# Patient Record
Sex: FEMALE | Race: OTHER | Employment: OTHER | URBAN - METROPOLITAN AREA
[De-identification: names, ages, dates, MRNs, and addresses within clinical notes are randomized per-mention and may not be internally consistent; named-entity substitution may affect disease eponyms.]

---

## 2017-12-31 PROBLEM — H10.32 ACUTE BACTERIAL CONJUNCTIVITIS OF LEFT EYE: Status: ACTIVE | Noted: 2017-12-31

## 2018-07-27 ENCOUNTER — OFFICE VISIT (OUTPATIENT)
Dept: PHYSICAL MEDICINE AND REHAB | Age: 68
End: 2018-07-27
Payer: MEDICARE

## 2018-07-27 VITALS
OXYGEN SATURATION: 98 % | BODY MASS INDEX: 22.15 KG/M2 | TEMPERATURE: 97.7 F | HEART RATE: 73 BPM | WEIGHT: 125 LBS | SYSTOLIC BLOOD PRESSURE: 110 MMHG | HEIGHT: 63 IN | DIASTOLIC BLOOD PRESSURE: 68 MMHG

## 2018-07-27 DIAGNOSIS — R20.2 NUMBNESS AND TINGLING OF BOTH LOWER EXTREMITIES: ICD-10-CM

## 2018-07-27 DIAGNOSIS — R20.0 NUMBNESS AND TINGLING OF BOTH LOWER EXTREMITIES: ICD-10-CM

## 2018-07-27 DIAGNOSIS — G62.9 POLYNEUROPATHY: Primary | ICD-10-CM

## 2018-07-27 PROCEDURE — 1036F TOBACCO NON-USER: CPT | Performed by: PHYSICAL MEDICINE & REHABILITATION

## 2018-07-27 PROCEDURE — 1101F PT FALLS ASSESS-DOCD LE1/YR: CPT | Performed by: PHYSICAL MEDICINE & REHABILITATION

## 2018-07-27 PROCEDURE — 95912 NRV CNDJ TEST 11-12 STUDIES: CPT | Performed by: PHYSICAL MEDICINE & REHABILITATION

## 2018-07-27 PROCEDURE — G8420 CALC BMI NORM PARAMETERS: HCPCS | Performed by: PHYSICAL MEDICINE & REHABILITATION

## 2018-07-27 PROCEDURE — G8400 PT W/DXA NO RESULTS DOC: HCPCS | Performed by: PHYSICAL MEDICINE & REHABILITATION

## 2018-07-27 PROCEDURE — 3017F COLORECTAL CA SCREEN DOC REV: CPT | Performed by: PHYSICAL MEDICINE & REHABILITATION

## 2018-07-27 PROCEDURE — 99202 OFFICE O/P NEW SF 15 MIN: CPT | Performed by: PHYSICAL MEDICINE & REHABILITATION

## 2018-07-27 PROCEDURE — 95886 MUSC TEST DONE W/N TEST COMP: CPT | Performed by: PHYSICAL MEDICINE & REHABILITATION

## 2018-07-27 PROCEDURE — 4040F PNEUMOC VAC/ADMIN/RCVD: CPT | Performed by: PHYSICAL MEDICINE & REHABILITATION

## 2018-07-27 PROCEDURE — 1090F PRES/ABSN URINE INCON ASSESS: CPT | Performed by: PHYSICAL MEDICINE & REHABILITATION

## 2018-07-27 PROCEDURE — G8427 DOCREV CUR MEDS BY ELIG CLIN: HCPCS | Performed by: PHYSICAL MEDICINE & REHABILITATION

## 2018-07-27 PROCEDURE — 1123F ACP DISCUSS/DSCN MKR DOCD: CPT | Performed by: PHYSICAL MEDICINE & REHABILITATION

## 2018-07-27 RX ORDER — LEVOCETIRIZINE DIHYDROCHLORIDE 5 MG/1
5 TABLET, FILM COATED ORAL PRN
COMMUNITY

## 2018-07-27 NOTE — PROGRESS NOTES
[address] [facility]        Patient: Ascencion Davis Unit #: [id number]  Test Date: 07/27/18 Technician: Love Morales  Sex: Female Interpr. Phy.: Dr. Pedro Luis Mesa  YOB: 1950 Referring Phy.: Dr. Ruthann Restrepo  Age: 78 Years 10 Months        Ascencion Davis is a 78 y.o. female who was seen today for an EMG of the bilateral lower extremities to evaluate for lumbar radiculopathy at your request. See separate note from same day for more details of the history and examination. Consent: The patient was advised of the indications, risks, benefits and alternatives to nerve conduction studies and electromyography and agreed to proceed. All abnormal values are clearly identified with bold text in the data collected from today's study. Normal values by age are provided at the end of this report for your review. Temperature was monitored throughout the nerve conductions via surface probe and maintained above 32* C in the upper extremities and 30*c in the lower extremities unless otherwise noted. Sensory NCS Lower      Nerve / Sites Onset Peak PP Amp Dist Bruno Temp.    ms ms µV cm m/s °C   R SURAL - Lat Mall      Ach. Ten. NR NR NR 14 NR 31.7   L SURAL - Lat Mall      Ach. Ten. NR NR NR 14 NR 31.1   R SUP PERONEAL      Lat Leg NR NR NR 10 NR 31.7   L SUP PERONEAL      Lat Leg NR NR NR 10 NR 31.1       Motor NCS Lower      Nerve / Sites Onset Amp. 1-2 Dist Bruno Temp. Amp. 1-2 d Lat.     ms mV cm m/s °C % ms   R COMM PERONEAL - EDB      Ankle 4.48 2.6 8  32 100 4.48      Knee 11.20 2.4 32 47.6 32 94.3 6.72   L COMM PERONEAL - EDB      Ankle 4.48 3.6 8  31.8 100 4.48      FibHead 11.15 3.6 32 48.0 31.8 99.4 6.67   R TIBIAL (KNEE) - AH      Ankle 4.01 10.8 8  31.7 100 4.01      Knee 10.99 8.7 34 48.7 31.7 80.9 6.98   L TIBIAL (KNEE) - AH      Ankle 3.23 7.5 8  31.7 100 3.23      Knee 11.46 6.0 34 41.3 31.7 80.0 8.23       F  Wave      Nerve Fmin    ms   R COMM PERONEAL 48.65   R TIBIAL (KNEE) 47.55   L

## 2018-08-10 ENCOUNTER — TELEPHONE (OUTPATIENT)
Dept: ORTHOPEDIC SURGERY | Age: 68
End: 2018-08-10

## 2018-08-10 DIAGNOSIS — R20.0 BILATERAL HAND NUMBNESS: Primary | ICD-10-CM

## 2018-08-13 ENCOUNTER — OFFICE VISIT (OUTPATIENT)
Dept: ORTHOPEDIC SURGERY | Age: 68
End: 2018-08-13
Payer: MEDICARE

## 2018-08-13 VITALS
HEART RATE: 69 BPM | HEIGHT: 60 IN | DIASTOLIC BLOOD PRESSURE: 69 MMHG | TEMPERATURE: 97.2 F | BODY MASS INDEX: 24.54 KG/M2 | RESPIRATION RATE: 16 BRPM | SYSTOLIC BLOOD PRESSURE: 102 MMHG | WEIGHT: 125 LBS

## 2018-08-13 DIAGNOSIS — M19.049 HAND ARTHRITIS: Primary | ICD-10-CM

## 2018-08-13 PROCEDURE — 4040F PNEUMOC VAC/ADMIN/RCVD: CPT | Performed by: ORTHOPAEDIC SURGERY

## 2018-08-13 PROCEDURE — 99214 OFFICE O/P EST MOD 30 MIN: CPT | Performed by: ORTHOPAEDIC SURGERY

## 2018-08-13 PROCEDURE — G8420 CALC BMI NORM PARAMETERS: HCPCS | Performed by: ORTHOPAEDIC SURGERY

## 2018-08-13 PROCEDURE — 1090F PRES/ABSN URINE INCON ASSESS: CPT | Performed by: ORTHOPAEDIC SURGERY

## 2018-08-13 PROCEDURE — 1036F TOBACCO NON-USER: CPT | Performed by: ORTHOPAEDIC SURGERY

## 2018-08-13 PROCEDURE — G8400 PT W/DXA NO RESULTS DOC: HCPCS | Performed by: ORTHOPAEDIC SURGERY

## 2018-08-13 PROCEDURE — 3017F COLORECTAL CA SCREEN DOC REV: CPT | Performed by: ORTHOPAEDIC SURGERY

## 2018-08-13 PROCEDURE — 1101F PT FALLS ASSESS-DOCD LE1/YR: CPT | Performed by: ORTHOPAEDIC SURGERY

## 2018-08-13 PROCEDURE — 1123F ACP DISCUSS/DSCN MKR DOCD: CPT | Performed by: ORTHOPAEDIC SURGERY

## 2018-08-13 PROCEDURE — G8427 DOCREV CUR MEDS BY ELIG CLIN: HCPCS | Performed by: ORTHOPAEDIC SURGERY

## 2018-08-13 NOTE — PROGRESS NOTES
Department of Orthopedic Surgery  History & Physical Exam      CHIEF COMPLAINT:   Chief Complaint   Patient presents with    Hand Pain     B/L Hand pain right is worse        HISTORY OF PRESENT ILLNESS:                The patient is a 78 y.o. female who presents with significant right index finger PIP pain. She has been recently seen within the past year for symptoms of carpal tunnel, dequervain which was injected, and general hand osteoarthritis. Her pain to the index finger pip is now constant, relieved by nothing, and pains her at night. She does not want an injection of the joint at states at this point she is ready for surgical intervention    Past Medical History:        Diagnosis Date    Arthritis     hands    Back problem     Constipation     Dizzy     states retains fluid in my head, takes maxzide to treat    Hyperlipidemia     Hypothyroidism     Nausea & vomiting     Neck problem     Stomach pain     Thyroid disease      Past Surgical History:        Procedure Laterality Date    COLONOSCOPY  6/6/2012    TUBAL LIGATION      UTERINE FIBROID SURGERY       Current Medications:   No current facility-administered medications for this visit. Allergies:  Iodine; Prednisone; and Demerol    Social History:   TOBACCO:   reports that she is a non-smoker but has been exposed to tobacco smoke. She has never used smokeless tobacco.  ETOH:   reports that she does not drink alcohol. DRUGS:   reports that she does not use drugs.   ACTIVITIES OF DAILY LIVING:    OCCUPATION:    Family History:   Family History   Problem Relation Age of Onset    Heart Disease Mother     Other Father         ACCIDENT       REVIEW OF SYSTEMS:   Skin: no abnormal pigmentation, rash  Eyes: no blurring or eye pain   Ears/Nose/Throat: no hearing loss, tinnitus  Respiratory: No increased work of breathing, no coughing  Cardiovascular: Brisk capillary refill bilaterally, well perfused extremities  Gastrointestinal: no nausea,

## 2018-08-20 ENCOUNTER — PREP FOR PROCEDURE (OUTPATIENT)
Dept: ORTHOPEDIC SURGERY | Age: 68
End: 2018-08-20

## 2018-08-20 RX ORDER — SODIUM CHLORIDE 0.9 % (FLUSH) 0.9 %
10 SYRINGE (ML) INJECTION PRN
Status: CANCELLED | OUTPATIENT
Start: 2018-08-20 | End: 2019-08-20

## 2018-08-20 RX ORDER — SODIUM CHLORIDE 9 MG/ML
INJECTION, SOLUTION INTRAVENOUS CONTINUOUS
Status: CANCELLED | OUTPATIENT
Start: 2018-08-20 | End: 2019-08-20

## 2018-08-20 RX ORDER — SODIUM CHLORIDE 0.9 % (FLUSH) 0.9 %
10 SYRINGE (ML) INJECTION EVERY 12 HOURS SCHEDULED
Status: CANCELLED | OUTPATIENT
Start: 2018-08-20 | End: 2019-08-20

## 2018-10-05 ENCOUNTER — HOSPITAL ENCOUNTER (OUTPATIENT)
Dept: PREADMISSION TESTING | Age: 68
Discharge: HOME | End: 2018-10-05
Payer: MEDICARE

## 2018-10-05 ENCOUNTER — ANESTHESIA EVENT (OUTPATIENT)
Dept: OPERATING ROOM | Age: 68
End: 2018-10-05
Payer: MEDICARE

## 2018-10-05 VITALS
HEART RATE: 68 BPM | WEIGHT: 125.5 LBS | SYSTOLIC BLOOD PRESSURE: 134 MMHG | TEMPERATURE: 97.7 F | RESPIRATION RATE: 16 BRPM | DIASTOLIC BLOOD PRESSURE: 70 MMHG | BODY MASS INDEX: 24.64 KG/M2 | HEIGHT: 60 IN | OXYGEN SATURATION: 98 %

## 2018-10-05 LAB
ANION GAP SERPL CALCULATED.3IONS-SCNC: 9 MMOL/L (ref 7–16)
BUN BLDV-MCNC: 16 MG/DL (ref 8–23)
CALCIUM SERPL-MCNC: 9.8 MG/DL (ref 8.6–10.2)
CHLORIDE BLD-SCNC: 103 MMOL/L (ref 98–107)
CO2: 28 MMOL/L (ref 22–29)
CREAT SERPL-MCNC: 0.7 MG/DL (ref 0.5–1)
GFR AFRICAN AMERICAN: >60
GFR NON-AFRICAN AMERICAN: >60 ML/MIN/1.73
GLUCOSE BLD-MCNC: 92 MG/DL (ref 74–109)
HCT VFR BLD CALC: 40.4 % (ref 34–48)
HEMOGLOBIN: 13.4 G/DL (ref 11.5–15.5)
MCH RBC QN AUTO: 27.8 PG (ref 26–35)
MCHC RBC AUTO-ENTMCNC: 33.2 % (ref 32–34.5)
MCV RBC AUTO: 83.8 FL (ref 80–99.9)
PDW BLD-RTO: 14.1 FL (ref 11.5–15)
PLATELET # BLD: 202 E9/L (ref 130–450)
PMV BLD AUTO: 11 FL (ref 7–12)
POTASSIUM SERPL-SCNC: 4.4 MMOL/L (ref 3.5–5)
RBC # BLD: 4.82 E12/L (ref 3.5–5.5)
SODIUM BLD-SCNC: 140 MMOL/L (ref 132–146)
WBC # BLD: 4.8 E9/L (ref 4.5–11.5)

## 2018-10-05 PROCEDURE — 36415 COLL VENOUS BLD VENIPUNCTURE: CPT

## 2018-10-05 PROCEDURE — 87081 CULTURE SCREEN ONLY: CPT

## 2018-10-05 PROCEDURE — 85027 COMPLETE CBC AUTOMATED: CPT

## 2018-10-05 PROCEDURE — 80048 BASIC METABOLIC PNL TOTAL CA: CPT

## 2018-10-05 RX ORDER — GABAPENTIN 100 MG/1
100 CAPSULE ORAL 3 TIMES DAILY
COMMUNITY
End: 2019-02-20

## 2018-10-06 LAB — MRSA CULTURE ONLY: NORMAL

## 2018-10-10 ENCOUNTER — ANESTHESIA (OUTPATIENT)
Dept: OPERATING ROOM | Age: 68
End: 2018-10-10
Payer: MEDICARE

## 2018-10-10 ENCOUNTER — APPOINTMENT (OUTPATIENT)
Dept: GENERAL RADIOLOGY | Age: 68
End: 2018-10-10
Attending: ORTHOPAEDIC SURGERY
Payer: MEDICARE

## 2018-10-10 ENCOUNTER — HOSPITAL ENCOUNTER (OUTPATIENT)
Age: 68
Setting detail: OUTPATIENT SURGERY
Discharge: HOME | End: 2018-10-10
Attending: ORTHOPAEDIC SURGERY | Admitting: ORTHOPAEDIC SURGERY
Payer: MEDICARE

## 2018-10-10 VITALS
DIASTOLIC BLOOD PRESSURE: 61 MMHG | TEMPERATURE: 92.5 F | OXYGEN SATURATION: 100 % | RESPIRATION RATE: 7 BRPM | SYSTOLIC BLOOD PRESSURE: 127 MMHG

## 2018-10-10 VITALS
BODY MASS INDEX: 24.54 KG/M2 | TEMPERATURE: 97.3 F | SYSTOLIC BLOOD PRESSURE: 136 MMHG | DIASTOLIC BLOOD PRESSURE: 63 MMHG | WEIGHT: 125 LBS | OXYGEN SATURATION: 97 % | HEART RATE: 75 BPM | RESPIRATION RATE: 16 BRPM | HEIGHT: 60 IN

## 2018-10-10 DIAGNOSIS — G89.18 POST-OPERATIVE PAIN: Primary | ICD-10-CM

## 2018-10-10 PROCEDURE — 6370000000 HC RX 637 (ALT 250 FOR IP)

## 2018-10-10 PROCEDURE — 3700000001 HC ADD 15 MINUTES (ANESTHESIA): Performed by: ORTHOPAEDIC SURGERY

## 2018-10-10 PROCEDURE — 2580000003 HC RX 258

## 2018-10-10 PROCEDURE — 6360000002 HC RX W HCPCS

## 2018-10-10 PROCEDURE — 3600000013 HC SURGERY LEVEL 3 ADDTL 15MIN: Performed by: ORTHOPAEDIC SURGERY

## 2018-10-10 PROCEDURE — 2709999900 HC NON-CHARGEABLE SUPPLY: Performed by: ORTHOPAEDIC SURGERY

## 2018-10-10 PROCEDURE — 2580000003 HC RX 258: Performed by: PHYSICIAN ASSISTANT

## 2018-10-10 PROCEDURE — 2500000003 HC RX 250 WO HCPCS: Performed by: ORTHOPAEDIC SURGERY

## 2018-10-10 PROCEDURE — 2500000003 HC RX 250 WO HCPCS

## 2018-10-10 PROCEDURE — 3600000003 HC SURGERY LEVEL 3 BASE: Performed by: ORTHOPAEDIC SURGERY

## 2018-10-10 PROCEDURE — 7100000010 HC PHASE II RECOVERY - FIRST 15 MIN: Performed by: ORTHOPAEDIC SURGERY

## 2018-10-10 PROCEDURE — 3700000000 HC ANESTHESIA ATTENDED CARE: Performed by: ORTHOPAEDIC SURGERY

## 2018-10-10 PROCEDURE — C1713 ANCHOR/SCREW BN/BN,TIS/BN: HCPCS | Performed by: ORTHOPAEDIC SURGERY

## 2018-10-10 PROCEDURE — C1776 JOINT DEVICE (IMPLANTABLE): HCPCS | Performed by: ORTHOPAEDIC SURGERY

## 2018-10-10 PROCEDURE — 6360000002 HC RX W HCPCS: Performed by: NURSE ANESTHETIST, CERTIFIED REGISTERED

## 2018-10-10 PROCEDURE — 3209999900 FLUORO FOR SURGICAL PROCEDURES

## 2018-10-10 PROCEDURE — 7100000001 HC PACU RECOVERY - ADDTL 15 MIN: Performed by: ORTHOPAEDIC SURGERY

## 2018-10-10 PROCEDURE — 26536 REVISE/IMPLANT FINGER JOINT: CPT | Performed by: ORTHOPAEDIC SURGERY

## 2018-10-10 PROCEDURE — 7100000000 HC PACU RECOVERY - FIRST 15 MIN: Performed by: ORTHOPAEDIC SURGERY

## 2018-10-10 PROCEDURE — 7100000011 HC PHASE II RECOVERY - ADDTL 15 MIN: Performed by: ORTHOPAEDIC SURGERY

## 2018-10-10 PROCEDURE — 6360000002 HC RX W HCPCS: Performed by: PHYSICIAN ASSISTANT

## 2018-10-10 DEVICE — IMPLANTABLE DEVICE
Type: IMPLANTABLE DEVICE | Site: HAND | Status: FUNCTIONAL
Brand: SWANSON

## 2018-10-10 RX ORDER — SODIUM CHLORIDE, SODIUM LACTATE, POTASSIUM CHLORIDE, CALCIUM CHLORIDE 600; 310; 30; 20 MG/100ML; MG/100ML; MG/100ML; MG/100ML
INJECTION, SOLUTION INTRAVENOUS CONTINUOUS PRN
Status: DISCONTINUED | OUTPATIENT
Start: 2018-10-10 | End: 2018-10-10 | Stop reason: SDUPTHER

## 2018-10-10 RX ORDER — MIDAZOLAM HYDROCHLORIDE 1 MG/ML
INJECTION INTRAMUSCULAR; INTRAVENOUS PRN
Status: DISCONTINUED | OUTPATIENT
Start: 2018-10-10 | End: 2018-10-10 | Stop reason: SDUPTHER

## 2018-10-10 RX ORDER — OXYCODONE HYDROCHLORIDE AND ACETAMINOPHEN 5; 325 MG/1; MG/1
1 TABLET ORAL EVERY 6 HOURS PRN
Qty: 28 TABLET | Refills: 0 | Status: SHIPPED | OUTPATIENT
Start: 2018-10-10 | End: 2018-10-17

## 2018-10-10 RX ORDER — EPHEDRINE SULFATE/0.9% NACL/PF 50 MG/5 ML
SYRINGE (ML) INTRAVENOUS PRN
Status: DISCONTINUED | OUTPATIENT
Start: 2018-10-10 | End: 2018-10-10 | Stop reason: SDUPTHER

## 2018-10-10 RX ORDER — PROPOFOL 10 MG/ML
INJECTION, EMULSION INTRAVENOUS PRN
Status: DISCONTINUED | OUTPATIENT
Start: 2018-10-10 | End: 2018-10-10 | Stop reason: SDUPTHER

## 2018-10-10 RX ORDER — OXYCODONE HYDROCHLORIDE AND ACETAMINOPHEN 5; 325 MG/1; MG/1
1 TABLET ORAL PRN
Status: DISCONTINUED | OUTPATIENT
Start: 2018-10-10 | End: 2018-10-10 | Stop reason: HOSPADM

## 2018-10-10 RX ORDER — LIDOCAINE HYDROCHLORIDE 20 MG/ML
INJECTION, SOLUTION EPIDURAL; INFILTRATION; INTRACAUDAL; PERINEURAL PRN
Status: DISCONTINUED | OUTPATIENT
Start: 2018-10-10 | End: 2018-10-10 | Stop reason: SDUPTHER

## 2018-10-10 RX ORDER — FENTANYL CITRATE 50 UG/ML
INJECTION, SOLUTION INTRAMUSCULAR; INTRAVENOUS PRN
Status: DISCONTINUED | OUTPATIENT
Start: 2018-10-10 | End: 2018-10-10 | Stop reason: SDUPTHER

## 2018-10-10 RX ORDER — BUPIVACAINE HYDROCHLORIDE 5 MG/ML
INJECTION, SOLUTION EPIDURAL; INTRACAUDAL PRN
Status: DISCONTINUED | OUTPATIENT
Start: 2018-10-10 | End: 2018-10-10 | Stop reason: HOSPADM

## 2018-10-10 RX ORDER — SODIUM CHLORIDE 0.9 % (FLUSH) 0.9 %
10 SYRINGE (ML) INJECTION PRN
Status: DISCONTINUED | OUTPATIENT
Start: 2018-10-10 | End: 2018-10-10 | Stop reason: HOSPADM

## 2018-10-10 RX ORDER — ONDANSETRON 2 MG/ML
INJECTION INTRAMUSCULAR; INTRAVENOUS PRN
Status: DISCONTINUED | OUTPATIENT
Start: 2018-10-10 | End: 2018-10-10 | Stop reason: SDUPTHER

## 2018-10-10 RX ORDER — SCOLOPAMINE TRANSDERMAL SYSTEM 1 MG/1
1 PATCH, EXTENDED RELEASE TRANSDERMAL ONCE
Status: DISCONTINUED | OUTPATIENT
Start: 2018-10-10 | End: 2018-10-10 | Stop reason: HOSPADM

## 2018-10-10 RX ORDER — SODIUM CHLORIDE 0.9 % (FLUSH) 0.9 %
10 SYRINGE (ML) INJECTION EVERY 12 HOURS SCHEDULED
Status: DISCONTINUED | OUTPATIENT
Start: 2018-10-10 | End: 2018-10-10 | Stop reason: HOSPADM

## 2018-10-10 RX ORDER — DIPHENHYDRAMINE HYDROCHLORIDE 50 MG/ML
12.5 INJECTION INTRAMUSCULAR; INTRAVENOUS
Status: DISCONTINUED | OUTPATIENT
Start: 2018-10-10 | End: 2018-10-10 | Stop reason: HOSPADM

## 2018-10-10 RX ORDER — SODIUM CHLORIDE 9 MG/ML
INJECTION, SOLUTION INTRAVENOUS CONTINUOUS
Status: DISCONTINUED | OUTPATIENT
Start: 2018-10-10 | End: 2018-10-10 | Stop reason: HOSPADM

## 2018-10-10 RX ORDER — SCOLOPAMINE TRANSDERMAL SYSTEM 1 MG/1
PATCH, EXTENDED RELEASE TRANSDERMAL
Status: DISCONTINUED
Start: 2018-10-10 | End: 2018-10-10 | Stop reason: HOSPADM

## 2018-10-10 RX ORDER — OXYCODONE HYDROCHLORIDE AND ACETAMINOPHEN 5; 325 MG/1; MG/1
2 TABLET ORAL PRN
Status: DISCONTINUED | OUTPATIENT
Start: 2018-10-10 | End: 2018-10-10 | Stop reason: HOSPADM

## 2018-10-10 RX ADMIN — SODIUM CHLORIDE, POTASSIUM CHLORIDE, SODIUM LACTATE AND CALCIUM CHLORIDE: 600; 310; 30; 20 INJECTION, SOLUTION INTRAVENOUS at 12:15

## 2018-10-10 RX ADMIN — FENTANYL CITRATE 100 MCG: 50 INJECTION, SOLUTION INTRAMUSCULAR; INTRAVENOUS at 11:14

## 2018-10-10 RX ADMIN — LIDOCAINE HYDROCHLORIDE 100 MG: 20 INJECTION, SOLUTION EPIDURAL; INFILTRATION; INTRACAUDAL; PERINEURAL at 11:14

## 2018-10-10 RX ADMIN — Medication 5 MG: at 11:28

## 2018-10-10 RX ADMIN — PROPOFOL 100 MG: 10 INJECTION, EMULSION INTRAVENOUS at 11:14

## 2018-10-10 RX ADMIN — ONDANSETRON HYDROCHLORIDE 4 MG: 2 INJECTION, SOLUTION INTRAMUSCULAR; INTRAVENOUS at 12:28

## 2018-10-10 RX ADMIN — SODIUM CHLORIDE: 9 INJECTION, SOLUTION INTRAVENOUS at 08:38

## 2018-10-10 RX ADMIN — Medication 5 MG: at 11:40

## 2018-10-10 RX ADMIN — Medication 2 G: at 11:09

## 2018-10-10 RX ADMIN — SODIUM CHLORIDE, POTASSIUM CHLORIDE, SODIUM LACTATE AND CALCIUM CHLORIDE: 600; 310; 30; 20 INJECTION, SOLUTION INTRAVENOUS at 11:09

## 2018-10-10 RX ADMIN — MIDAZOLAM HYDROCHLORIDE 2 MG: 1 INJECTION, SOLUTION INTRAMUSCULAR; INTRAVENOUS at 11:09

## 2018-10-10 ASSESSMENT — PAIN SCALES - GENERAL
PAINLEVEL_OUTOF10: 0
PAINLEVEL_OUTOF10: 2
PAINLEVEL_OUTOF10: 0

## 2018-10-10 ASSESSMENT — PULMONARY FUNCTION TESTS
PIF_VALUE: 15
PIF_VALUE: 11
PIF_VALUE: 18
PIF_VALUE: 17
PIF_VALUE: 10
PIF_VALUE: 20
PIF_VALUE: 15
PIF_VALUE: 20
PIF_VALUE: 19
PIF_VALUE: 20
PIF_VALUE: 19
PIF_VALUE: 20
PIF_VALUE: 18
PIF_VALUE: 18
PIF_VALUE: 19
PIF_VALUE: 20
PIF_VALUE: 19
PIF_VALUE: 5
PIF_VALUE: 10
PIF_VALUE: 5
PIF_VALUE: 19
PIF_VALUE: 18
PIF_VALUE: 16
PIF_VALUE: 19
PIF_VALUE: 19
PIF_VALUE: 18
PIF_VALUE: 17
PIF_VALUE: 17
PIF_VALUE: 6
PIF_VALUE: 18
PIF_VALUE: 19
PIF_VALUE: 18
PIF_VALUE: 15
PIF_VALUE: 20
PIF_VALUE: 17
PIF_VALUE: 18
PIF_VALUE: 15
PIF_VALUE: 19
PIF_VALUE: 15
PIF_VALUE: 18
PIF_VALUE: 15
PIF_VALUE: 5
PIF_VALUE: 17
PIF_VALUE: 20
PIF_VALUE: 19
PIF_VALUE: 3
PIF_VALUE: 20
PIF_VALUE: 18
PIF_VALUE: 18
PIF_VALUE: 20
PIF_VALUE: 2
PIF_VALUE: 17
PIF_VALUE: 15
PIF_VALUE: 20
PIF_VALUE: 20
PIF_VALUE: 18
PIF_VALUE: 18
PIF_VALUE: 19
PIF_VALUE: 10
PIF_VALUE: 22
PIF_VALUE: 23
PIF_VALUE: 19
PIF_VALUE: 2
PIF_VALUE: 1
PIF_VALUE: 10
PIF_VALUE: 10
PIF_VALUE: 20
PIF_VALUE: 18
PIF_VALUE: 19
PIF_VALUE: 20
PIF_VALUE: 9
PIF_VALUE: 20
PIF_VALUE: 15
PIF_VALUE: 10
PIF_VALUE: 2
PIF_VALUE: 18
PIF_VALUE: 20
PIF_VALUE: 15
PIF_VALUE: 19
PIF_VALUE: 15
PIF_VALUE: 2
PIF_VALUE: 19
PIF_VALUE: 27
PIF_VALUE: 17
PIF_VALUE: 0
PIF_VALUE: 19
PIF_VALUE: 15
PIF_VALUE: 19
PIF_VALUE: 18
PIF_VALUE: 20
PIF_VALUE: 19
PIF_VALUE: 11
PIF_VALUE: 18
PIF_VALUE: 18
PIF_VALUE: 2
PIF_VALUE: 19
PIF_VALUE: 10

## 2018-10-10 ASSESSMENT — PAIN - FUNCTIONAL ASSESSMENT: PAIN_FUNCTIONAL_ASSESSMENT: 0-10

## 2018-10-10 ASSESSMENT — PAIN DESCRIPTION - PAIN TYPE: TYPE: SURGICAL PAIN

## 2018-10-10 NOTE — ANESTHESIA PRE PROCEDURE
Department of Anesthesiology  Preprocedure Note       Name:  Brandy Gallagher   Age:  80 y.o.  :  1950                                          MRN:  [id number]         Date:  10/10/2018      Surgeon: Edgar Rand):  Fely Sanches MD    Procedure: Procedure(s):  RIGHT INDEX FINGER PIP JOINT ARTHROPLASTY ([facility] MEDICAL,TPS RUI)++IODINE ALLERGY++    Medications prior to admission:   Prior to Admission medications    Medication Sig Start Date End Date Taking? Authorizing Provider   oxyCODONE-acetaminophen (PERCOCET) 5-325 MG per tablet Take 1 tablet by mouth every 6 hours as needed for Pain for up to 7 days. . 10/10/18 10/17/18 Yes LUPIS Orta   Calcium Citrate-Vitamin D (CALCIUM + D PO) Take 1 tablet by mouth daily    Historical Provider, MD   gabapentin (NEURONTIN) 100 MG capsule Take 100 mg by mouth 3 times daily. Take am day of surgery 10/10. Dede Brito Historical Provider, MD   levocetirizine (XYZAL) 5 MG tablet Take 5 mg by mouth as needed    Historical Provider, MD   fluticasone (FLONASE) 50 MCG/ACT nasal spray SHAKE LQ AND U 1 TO 2 SPRAYS IEN D 10/12/16   Historical Provider, MD   pravastatin (PRAVACHOL) 20 MG tablet Take 20 mg by mouth daily 10/05 pt states takes \"once in a while\", states PCP aware 6/3/16   Historical Provider, MD   albuterol sulfate  (90 BASE) MCG/ACT inhaler Inhale 2 puffs into the lungs every 6 hours as needed for Wheezing    Historical Provider, MD   levothyroxine (SYNTHROID) 75 MCG tablet Take 75 mcg by mouth daily.       Historical Provider, MD   Triamterene-HCTZ (MAXZIDE-25 PO) Take 1 tablet by mouth daily Pt takes for dizziness    Historical Provider, MD       Current medications:    Current Facility-Administered Medications   Medication Dose Route Frequency Provider Last Rate Last Dose    diphenhydrAMINE (BENADRYL) injection 12.5 mg  12.5 mg Intravenous Once PRN Malcolm Contreras MD        oxyCODONE-acetaminophen (PERCOCET) 5-325 MG per tablet 1 tablet  1 tablet Oral PRN GI/Hepatic/Renal:   (+) GERD:,           Endo/Other:    (+) hypothyroidism: arthritis: OA., .                 Abdominal:           Vascular: negative vascular ROS. Anesthesia Plan      general     ASA 3     (#4 LMA)  Induction: intravenous. Anesthetic plan and risks discussed with patient. Plan discussed with CRNA.                   Lolita Fabry, MD   10/10/2018

## 2018-10-10 NOTE — BRIEF OP NOTE
Brief Postoperative Note  ______________________________________________________________    Patient: Judith Swift  YOB: 1950  MRN: [id number]  Date of Procedure: 10/10/2018    Pre-Op Diagnosis: RIGHT HAND INDEX FINGER PIP ARTHRITIS    Post-Op Diagnosis: Same       Procedure(s):  RIGHT INDEX FINGER PIP JOINT ARTHROPLASTY    Anesthesia: General    Surgeon(s):  Cecilia Urbina MD    Staff:  Marina Scrub: Selin Kline  Scrub Person First: Lawyer Lee  Physician Assistant: LUPIS Pelletier     Estimated Blood Loss: <37 mL    Complications: None    Specimens:   * No specimens in log *    Implants:    Implant Name Type Inv.  Item Serial No.  Lot No. LRB No. Used   [facility] FINGER JOINT IMPLANT W/O GROMMETS SIZE 2 REF# [id number]     [id number]   [id number] Right 1         Drains:      Findings: see op note    Jailene Buchanan DO  Date: 10/10/2018  Time: 12:46 PM

## 2018-10-10 NOTE — ANESTHESIA POSTPROCEDURE EVALUATION
Department of Anesthesiology  Postprocedure Note    Patient: Ellie Astudillo  MRN: [id number]  YOB: 1950  Date of evaluation: 10/10/2018  Time:  2:19 PM     Procedure Summary     Date:  10/10/18 Room / Location:  [facility] OR  / [facility] OR    Anesthesia Start:  1109 Anesthesia Stop:  1254    Procedure:  RIGHT INDEX FINGER PIP JOINT ARTHROPLASTY (Right ) Diagnosis:  (RIGHT HAND ARTHRITIS)    Surgeon:  Misty Ponce MD Responsible Provider:  Sabino Kraft MD    Anesthesia Type:  general ASA Status:  3          Anesthesia Type: general    Ashvin Phase I: Ashvin Score: 10    Ashvin Phase II: Ashvin Score: 10    Last vitals: Reviewed and per EMR flowsheets.        Anesthesia Post Evaluation    Patient location during evaluation: PACU  Patient participation: complete - patient participated  Level of consciousness: awake and alert  Airway patency: patent  Nausea & Vomiting: no nausea and no vomiting  Complications: no  Cardiovascular status: hemodynamically stable  Respiratory status: acceptable  Hydration status: euvolemic

## 2018-10-11 NOTE — OP NOTE
[address] [address]                               OPERATIVE REPORT    PATIENT NAME: Anila Ramos                    :         1950  MED REC NO:   [id number]                            ROOM:  ACCOUNT NO:   [de-identified]                           ADMIT DATE:  10/10/2018  PROVIDER:     Claudette Fryer, MD    DATE OF PROCEDURE:  10/10/2018    PREOPERATIVE DIAGNOSIS:  Right index finger PIP joint arthritis. POSTOPERATIVE DIAGNOSIS:  Right index finger PIP joint arthritis. PROCEDURE PERFORMED:  Right index finger PIP joint arthroplasty using  the [facility] Silastic PIP implant size 2. ANESTHESIA:  1. General.  2.  Digital block by surgeon consisting of approximately 10 mL of  0.25% Marcaine plain. ASSISTANTS:  1. Calvin Campbell, Orthopedic Surgery resident. 2.  Idania Braga, physician assistant certified. She was present  throughout the procedure. Her assistance was used for preoperative  positioning, intraoperative retraction, closure, and dressing  application. Her assistance expedited the case and decreased the  surgical time. TOTAL TOURNIQUET TIME:  Approximately 57 minutes at 250 mmHg of  brachial tourniquet. FINDINGS:  1. Intraoperative fluoroscopy as well as direct visualization  confirmed severe end-stage arthrosis of the PIP joint of the index  finger. 2.  Status post PIP joint arthroplasty, excellent range of motion with  full extension and flexion to 90 degrees is restored. 3.  The radial collateral ligament of the digit was reinforced with a  FiberWire suture placed through bone tunnels with excellent stability  maintained to ulnar collateral ligament stress testing. COMPLICATIONS:  None. DISPOSITION:  The patient was stable throughout the procedure. OPERATIVE INDICATIONS:  The patient is a 71-year-old female with  persistent recalcitrant right index finger pain.

## 2018-10-17 ENCOUNTER — TELEPHONE (OUTPATIENT)
Dept: ORTHOPEDIC SURGERY | Age: 68
End: 2018-10-17

## 2018-10-17 DIAGNOSIS — M19.049 HAND ARTHRITIS: Primary | ICD-10-CM

## 2018-10-18 ENCOUNTER — OFFICE VISIT (OUTPATIENT)
Dept: ORTHOPEDIC SURGERY | Age: 68
End: 2018-10-18

## 2018-10-18 VITALS
TEMPERATURE: 97.9 F | DIASTOLIC BLOOD PRESSURE: 68 MMHG | HEART RATE: 66 BPM | RESPIRATION RATE: 18 BRPM | SYSTOLIC BLOOD PRESSURE: 120 MMHG | HEIGHT: 60 IN | BODY MASS INDEX: 24.54 KG/M2 | WEIGHT: 125 LBS

## 2018-10-18 DIAGNOSIS — M19.049 HAND ARTHRITIS: Primary | ICD-10-CM

## 2018-10-18 PROCEDURE — 99024 POSTOP FOLLOW-UP VISIT: CPT | Performed by: ORTHOPAEDIC SURGERY

## 2018-10-24 ENCOUNTER — HOSPITAL ENCOUNTER (OUTPATIENT)
Dept: OCCUPATIONAL THERAPY | Age: 68
Setting detail: THERAPIES SERIES
Discharge: HOME | End: 2018-10-24
Payer: MEDICARE

## 2018-10-24 PROCEDURE — 97165 OT EVAL LOW COMPLEX 30 MIN: CPT | Performed by: OCCUPATIONAL THERAPIST

## 2018-10-24 PROCEDURE — G8984 CARRY CURRENT STATUS: HCPCS | Performed by: OCCUPATIONAL THERAPIST

## 2018-10-24 PROCEDURE — 97760 ORTHOTIC MGMT&TRAING 1ST ENC: CPT | Performed by: OCCUPATIONAL THERAPIST

## 2018-10-24 PROCEDURE — G8985 CARRY GOAL STATUS: HCPCS | Performed by: OCCUPATIONAL THERAPIST

## 2018-10-24 PROCEDURE — 97140 MANUAL THERAPY 1/> REGIONS: CPT | Performed by: OCCUPATIONAL THERAPIST

## 2018-10-29 ENCOUNTER — HOSPITAL ENCOUNTER (OUTPATIENT)
Dept: OCCUPATIONAL THERAPY | Age: 68
Setting detail: THERAPIES SERIES
Discharge: HOME | End: 2018-10-29
Payer: MEDICARE

## 2018-10-29 PROCEDURE — 97110 THERAPEUTIC EXERCISES: CPT

## 2018-10-29 PROCEDURE — 97022 WHIRLPOOL THERAPY: CPT

## 2018-10-29 PROCEDURE — 97140 MANUAL THERAPY 1/> REGIONS: CPT

## 2018-10-29 PROCEDURE — 97530 THERAPEUTIC ACTIVITIES: CPT

## 2018-10-31 ENCOUNTER — HOSPITAL ENCOUNTER (OUTPATIENT)
Dept: OCCUPATIONAL THERAPY | Age: 68
Setting detail: THERAPIES SERIES
Discharge: HOME | End: 2018-10-31
Payer: MEDICARE

## 2018-10-31 PROCEDURE — 97140 MANUAL THERAPY 1/> REGIONS: CPT

## 2018-10-31 PROCEDURE — 97022 WHIRLPOOL THERAPY: CPT

## 2018-10-31 PROCEDURE — 97110 THERAPEUTIC EXERCISES: CPT

## 2018-11-05 ENCOUNTER — HOSPITAL ENCOUNTER (OUTPATIENT)
Dept: OCCUPATIONAL THERAPY | Age: 68
Setting detail: THERAPIES SERIES
Discharge: HOME | End: 2018-11-05
Payer: MEDICARE

## 2018-11-05 PROCEDURE — 97140 MANUAL THERAPY 1/> REGIONS: CPT

## 2018-11-05 PROCEDURE — 97110 THERAPEUTIC EXERCISES: CPT

## 2018-11-05 PROCEDURE — 97530 THERAPEUTIC ACTIVITIES: CPT

## 2018-11-05 PROCEDURE — 97022 WHIRLPOOL THERAPY: CPT

## 2018-11-07 ENCOUNTER — HOSPITAL ENCOUNTER (OUTPATIENT)
Dept: OCCUPATIONAL THERAPY | Age: 68
Setting detail: THERAPIES SERIES
Discharge: HOME | End: 2018-11-07
Payer: MEDICARE

## 2018-11-07 PROCEDURE — 97530 THERAPEUTIC ACTIVITIES: CPT

## 2018-11-07 PROCEDURE — 97110 THERAPEUTIC EXERCISES: CPT

## 2018-11-07 PROCEDURE — 97140 MANUAL THERAPY 1/> REGIONS: CPT

## 2018-11-12 ENCOUNTER — HOSPITAL ENCOUNTER (OUTPATIENT)
Dept: OCCUPATIONAL THERAPY | Age: 68
Setting detail: THERAPIES SERIES
Discharge: HOME | End: 2018-11-12
Payer: MEDICARE

## 2018-11-12 PROCEDURE — 97110 THERAPEUTIC EXERCISES: CPT

## 2018-11-12 PROCEDURE — 97530 THERAPEUTIC ACTIVITIES: CPT

## 2018-11-12 PROCEDURE — 97140 MANUAL THERAPY 1/> REGIONS: CPT

## 2018-11-12 NOTE — PROGRESS NOTES
328   Timed Treatment Minutes: 60 Minutes  Goals: Goals for pt can be see on initial eval occurring on 10/24/2018    Plan:   [x]  Continues Plan of care: Treatment delivered based on POC and graduated to patient's progress. Patient education continues at each visit to obtain maximum benefit from skilled OT intervention.   []  Alter Plan of care:   []  Discharge:    NANETTE Christianson, [address] [facility], [address] [facility]

## 2018-11-13 ENCOUNTER — OFFICE VISIT (OUTPATIENT)
Dept: ORTHOPEDIC SURGERY | Age: 68
End: 2018-11-13

## 2018-11-13 VITALS
DIASTOLIC BLOOD PRESSURE: 75 MMHG | TEMPERATURE: 97.9 F | SYSTOLIC BLOOD PRESSURE: 128 MMHG | RESPIRATION RATE: 16 BRPM | HEART RATE: 71 BPM

## 2018-11-13 DIAGNOSIS — T81.41XA ABSCESS INVOLVING SUTURE: ICD-10-CM

## 2018-11-13 DIAGNOSIS — M19.049 HAND ARTHRITIS: Primary | ICD-10-CM

## 2018-11-13 PROCEDURE — 99024 POSTOP FOLLOW-UP VISIT: CPT | Performed by: ORTHOPAEDIC SURGERY

## 2018-11-13 RX ORDER — SULFAMETHOXAZOLE AND TRIMETHOPRIM 800; 160 MG/1; MG/1
1 TABLET ORAL 2 TIMES DAILY
Qty: 20 TABLET | Refills: 0 | Status: SHIPPED | OUTPATIENT
Start: 2018-11-13 | End: 2018-11-23

## 2018-11-14 ENCOUNTER — HOSPITAL ENCOUNTER (OUTPATIENT)
Dept: OCCUPATIONAL THERAPY | Age: 68
Setting detail: THERAPIES SERIES
Discharge: HOME | End: 2018-11-14
Payer: MEDICARE

## 2018-11-14 PROCEDURE — 97124 MASSAGE THERAPY: CPT

## 2018-11-14 PROCEDURE — 97022 WHIRLPOOL THERAPY: CPT

## 2018-11-14 PROCEDURE — 97110 THERAPEUTIC EXERCISES: CPT

## 2018-11-15 ENCOUNTER — OFFICE VISIT (OUTPATIENT)
Dept: NEUROLOGY | Age: 68
End: 2018-11-15
Payer: MEDICARE

## 2018-11-15 VITALS
HEART RATE: 85 BPM | RESPIRATION RATE: 12 BRPM | DIASTOLIC BLOOD PRESSURE: 70 MMHG | SYSTOLIC BLOOD PRESSURE: 120 MMHG | BODY MASS INDEX: 24.35 KG/M2 | OXYGEN SATURATION: 99 % | HEIGHT: 60 IN | WEIGHT: 124 LBS

## 2018-11-15 DIAGNOSIS — G60.9 IDIOPATHIC PERIPHERAL NEUROPATHY: ICD-10-CM

## 2018-11-15 DIAGNOSIS — R94.131 ABNORMAL ELECTROMYOGRAM (EMG): ICD-10-CM

## 2018-11-15 DIAGNOSIS — E03.9 ACQUIRED HYPOTHYROIDISM: ICD-10-CM

## 2018-11-15 DIAGNOSIS — R25.2 FOOT CRAMPS: Chronic | ICD-10-CM

## 2018-11-15 DIAGNOSIS — G89.29 CHRONIC LEFT-SIDED LOW BACK PAIN WITH LEFT-SIDED SCIATICA: Primary | ICD-10-CM

## 2018-11-15 DIAGNOSIS — M25.551 CHRONIC RIGHT HIP PAIN: ICD-10-CM

## 2018-11-15 DIAGNOSIS — M54.42 CHRONIC LEFT-SIDED LOW BACK PAIN WITH LEFT-SIDED SCIATICA: Primary | ICD-10-CM

## 2018-11-15 DIAGNOSIS — G89.29 CHRONIC RIGHT HIP PAIN: ICD-10-CM

## 2018-11-15 PROBLEM — M54.40 CHRONIC LEFT-SIDED LOW BACK PAIN WITH SCIATICA: Chronic | Status: ACTIVE | Noted: 2018-11-15

## 2018-11-15 PROCEDURE — 99204 OFFICE O/P NEW MOD 45 MIN: CPT | Performed by: PSYCHIATRY & NEUROLOGY

## 2018-11-15 RX ORDER — MECLIZINE HCL 12.5 MG/1
TABLET ORAL
COMMUNITY
Start: 2018-11-14 | End: 2019-02-20

## 2018-11-15 ASSESSMENT — ENCOUNTER SYMPTOMS
RESPIRATORY NEGATIVE: 1
GASTROINTESTINAL NEGATIVE: 1
EYES NEGATIVE: 1
ALLERGIC/IMMUNOLOGIC NEGATIVE: 1
BACK PAIN: 1

## 2018-11-16 ENCOUNTER — HOSPITAL ENCOUNTER (OUTPATIENT)
Age: 68
Discharge: HOME | End: 2018-11-16
Payer: MEDICARE

## 2018-11-16 DIAGNOSIS — R94.131 ABNORMAL ELECTROMYOGRAM (EMG): ICD-10-CM

## 2018-11-16 DIAGNOSIS — G89.29 CHRONIC RIGHT HIP PAIN: ICD-10-CM

## 2018-11-16 DIAGNOSIS — M54.42 CHRONIC LEFT-SIDED LOW BACK PAIN WITH LEFT-SIDED SCIATICA: ICD-10-CM

## 2018-11-16 DIAGNOSIS — G89.29 CHRONIC LEFT-SIDED LOW BACK PAIN WITH LEFT-SIDED SCIATICA: ICD-10-CM

## 2018-11-16 DIAGNOSIS — E03.9 ACQUIRED HYPOTHYROIDISM: ICD-10-CM

## 2018-11-16 DIAGNOSIS — R25.2 FOOT CRAMPS: Chronic | ICD-10-CM

## 2018-11-16 DIAGNOSIS — M25.551 CHRONIC RIGHT HIP PAIN: ICD-10-CM

## 2018-11-16 DIAGNOSIS — G60.9 IDIOPATHIC PERIPHERAL NEUROPATHY: ICD-10-CM

## 2018-11-16 LAB
ALBUMIN SERPL-MCNC: 4.5 G/DL (ref 3.5–5.2)
ALP BLD-CCNC: 66 U/L (ref 35–104)
ALT SERPL-CCNC: 18 U/L (ref 0–32)
AST SERPL-CCNC: 19 U/L (ref 0–31)
BILIRUB SERPL-MCNC: 0.3 MG/DL (ref 0–1.2)
BILIRUBIN DIRECT: <0.2 MG/DL (ref 0–0.3)
BILIRUBIN, INDIRECT: NORMAL MG/DL (ref 0–1)
C-REACTIVE PROTEIN: <0.1 MG/DL (ref 0–0.4)
FOLATE: >20 NG/ML (ref 4.8–24.2)
GLUCOSE BLD-MCNC: 82 MG/DL (ref 74–99)
MAGNESIUM: 2.1 MG/DL (ref 1.6–2.6)
RHEUMATOID FACTOR: <10 IU/ML (ref 0–13)
SEDIMENTATION RATE, ERYTHROCYTE: 15 MM/HR (ref 0–20)
T4 FREE: 1.5 NG/DL (ref 0.93–1.7)
TOTAL CK: 99 U/L (ref 20–180)
TOTAL PROTEIN: 7.3 G/DL (ref 6.4–8.3)
TSH SERPL DL<=0.05 MIU/L-ACNC: 0.59 UIU/ML (ref 0.27–4.2)
VITAMIN B-12: 474 PG/ML (ref 211–946)

## 2018-11-16 PROCEDURE — 82607 VITAMIN B-12: CPT

## 2018-11-16 PROCEDURE — 84165 PROTEIN E-PHORESIS SERUM: CPT

## 2018-11-16 PROCEDURE — 82947 ASSAY GLUCOSE BLOOD QUANT: CPT

## 2018-11-16 PROCEDURE — 86803 HEPATITIS C AB TEST: CPT

## 2018-11-16 PROCEDURE — 86592 SYPHILIS TEST NON-TREP QUAL: CPT

## 2018-11-16 PROCEDURE — 86618 LYME DISEASE ANTIBODY: CPT

## 2018-11-16 PROCEDURE — 82746 ASSAY OF FOLIC ACID SERUM: CPT

## 2018-11-16 PROCEDURE — 83735 ASSAY OF MAGNESIUM: CPT

## 2018-11-16 PROCEDURE — 82085 ASSAY OF ALDOLASE: CPT

## 2018-11-16 PROCEDURE — 86431 RHEUMATOID FACTOR QUANT: CPT

## 2018-11-16 PROCEDURE — 82652 VIT D 1 25-DIHYDROXY: CPT

## 2018-11-16 PROCEDURE — 86140 C-REACTIVE PROTEIN: CPT

## 2018-11-16 PROCEDURE — 85651 RBC SED RATE NONAUTOMATED: CPT

## 2018-11-16 PROCEDURE — 82550 ASSAY OF CK (CPK): CPT

## 2018-11-16 PROCEDURE — 36415 COLL VENOUS BLD VENIPUNCTURE: CPT

## 2018-11-16 PROCEDURE — 84439 ASSAY OF FREE THYROXINE: CPT

## 2018-11-16 PROCEDURE — 84207 ASSAY OF VITAMIN B-6: CPT

## 2018-11-16 PROCEDURE — 80076 HEPATIC FUNCTION PANEL: CPT

## 2018-11-16 PROCEDURE — 86038 ANTINUCLEAR ANTIBODIES: CPT

## 2018-11-16 PROCEDURE — 84443 ASSAY THYROID STIM HORMONE: CPT

## 2018-11-16 PROCEDURE — 86235 NUCLEAR ANTIGEN ANTIBODY: CPT

## 2018-11-16 PROCEDURE — 82306 VITAMIN D 25 HYDROXY: CPT

## 2018-11-19 ENCOUNTER — TELEPHONE (OUTPATIENT)
Dept: NEUROLOGY | Age: 68
End: 2018-11-19

## 2018-11-19 ENCOUNTER — HOSPITAL ENCOUNTER (OUTPATIENT)
Dept: OCCUPATIONAL THERAPY | Age: 68
Setting detail: THERAPIES SERIES
Discharge: HOME | End: 2018-11-19
Payer: MEDICARE

## 2018-11-19 LAB
ALBUMIN SERPL-MCNC: 3.6 G/DL (ref 3.5–4.7)
ALDOLASE: 3.1 U/L (ref 1.5–8.1)
ALPHA-1-GLOBULIN: 0.3 G/DL (ref 0.2–0.4)
ALPHA-2-GLOBULIN: 0.7 G/FL (ref 0.5–1)
BETA GLOBULIN: 1.2 G/DL (ref 0.8–1.3)
ELECTROPHORESIS: NORMAL
ENA TO SSA (RO) ANTIBODY: NEGATIVE
ENA TO SSB (LA) ANTIBODY: NEGATIVE
GAMMA GLOBULIN: 1.2 G/DL (ref 0.7–1.6)
HEPATITIS C ANTIBODY INTERPRETATION: NORMAL
LYME, EIA: 0.36 LIV (ref 0–1.2)
RPR: NORMAL
VITAMIN D 1,25-DIHYDROXY: 62.6 PG/ML (ref 19.9–79.3)

## 2018-11-19 PROCEDURE — 97140 MANUAL THERAPY 1/> REGIONS: CPT

## 2018-11-19 PROCEDURE — 97110 THERAPEUTIC EXERCISES: CPT

## 2018-11-19 PROCEDURE — 97530 THERAPEUTIC ACTIVITIES: CPT

## 2018-11-19 PROCEDURE — 97022 WHIRLPOOL THERAPY: CPT

## 2018-11-19 NOTE — PROGRESS NOTES
Yes  -Time In: 225  -Time Out: 330  Timed Treatment Minutes: 60 Minutes  Goals:   1) Patient will demonstrate good understanding of home program(exercises/activities/diagnosis/prognosis/goals) with good accuracy. 2) Patient will demonstrate increased active/passive range of motion of their R hand to [facility] for ADL/IADL completion. 3) Patient will demonstrate increased /pinch strength of at least 10 / 5 pinch pounds of their R hand. (when protocol permitting)  4) Patient to report decreased pain in their affected R index finger from 0-5/10 to 0-2/10 or less with resistive functional use. 5) Patient to report 100% compliance with their splint wear, care, and precautions if needed. 6) Patient to report a decrease in hypersensitivity from 40% to 20% or less in their index scar. 7) Patient will be knowledgeable of edema control techniques as evident with decreases from mod to none. 8) Patient will demonstrate a non-tender/non-adherent scar and good tissue mobility. 9) Patient will report ADL functions same as prior to diagnosis of joint replacement. OT G-codes:  Carrying, Handling, Moving objects   (Current status): Janki Morales (Discharge Goal): CI     Plan:   [x]  Continues Plan of care: Treatment delivered based on POC and graduated to patient's progress. Patient education continues at each visit to obtain maximum benefit from skilled OT intervention.   []  Alter Plan of care:   []  Discharge:    Lobo Screen,  CARLIN/L, [address], [address] [facility]

## 2018-11-21 ENCOUNTER — HOSPITAL ENCOUNTER (OUTPATIENT)
Dept: OCCUPATIONAL THERAPY | Age: 68
Setting detail: THERAPIES SERIES
Discharge: HOME | End: 2018-11-21
Payer: MEDICARE

## 2018-11-21 LAB
ANTI-NUCLEAR ANTIBODY (ANA): NEGATIVE
VITAMIN B6: 43 NMOL/L (ref 20–125)

## 2018-11-21 PROCEDURE — 97530 THERAPEUTIC ACTIVITIES: CPT

## 2018-11-21 PROCEDURE — 97022 WHIRLPOOL THERAPY: CPT

## 2018-11-21 PROCEDURE — 97110 THERAPEUTIC EXERCISES: CPT

## 2018-11-21 PROCEDURE — 97140 MANUAL THERAPY 1/> REGIONS: CPT

## 2018-11-26 ENCOUNTER — HOSPITAL ENCOUNTER (OUTPATIENT)
Dept: OCCUPATIONAL THERAPY | Age: 68
Setting detail: THERAPIES SERIES
Discharge: HOME | End: 2018-11-26
Payer: MEDICARE

## 2018-11-26 LAB — VITAMIN D 25-HYDROXY: ABNORMAL NG/ML (ref 30–100)

## 2018-11-26 PROCEDURE — 97110 THERAPEUTIC EXERCISES: CPT | Performed by: OCCUPATIONAL THERAPIST

## 2018-11-26 PROCEDURE — 97022 WHIRLPOOL THERAPY: CPT | Performed by: OCCUPATIONAL THERAPIST

## 2018-11-26 PROCEDURE — 97140 MANUAL THERAPY 1/> REGIONS: CPT | Performed by: OCCUPATIONAL THERAPIST

## 2018-11-26 NOTE — PROGRESS NOTES
OCCUPATIONAL THERAPY PROGRESS NOTE    [facility]   Phone: [phone]   Fax: [phone]     Date:  2018  Initial Evaluation Date:  10/24/2018    Patient Name:  Alison Armas    :  1950  Restrictions/Precautions:  Per protocol, low fall risk  Diagnosis:  Pip joint replacement R                                        Insurance/Certification information:  Medicare  Referring Physician:  Dr. Heather Fletcher  Date of Surgery/Injury: 10/10  Plan of care signed (Y/N):  no  Visit# / total visits: 10 / 12-  10th visit:    Pain Level: moderate, aching, burning, pressure, sharp, throbbing, tight (pulling) and uncomfortable    Subjective: Seen 2/2 scheduled visits. Week 7 of protocol. \"Overall doing better. Finger still stiff, but bending some better. Mod pain cont's. Objective:  Updated POC to be completed by . Completed ROM per protocol and scar management. HEP education. INTERVENTION: COMPLETED REPS/TIME: SPECIFICS/COMMENTS:   Modality:      Fluidotherapy x 10min    Paraffin      MHP      Estim      Manual Therapy:      Scar Massage  8 min    Soft Tissue:  10 min    Therapeutic Ex/Activities:      AROM/PROM x 10 min First day of AROM flex/ex   Towel task x 10 reps    Shoulder arc  2x    UBE  5 min With finger around handle with no pressure   Putty x 5 min Flexion Stretches   Small peg activity x 10 min                                                    Extension tube x  For HS use to facilitate PIP extension   Other:      Digit cap   Scar and education   Finger flexion wrap provided. x  2 x daily /15 min or as jaspal. Black splint   Daily use with light tasks     Assessment: Patient shows potential to progress with stated goals and will be educated on HEP and provided written handouts as needed throughout their care. Pt is making Fair progress toward stated plan of care.    -Rehab Potential: Good  -Requires OT Follow Up: Yes  -Time In:

## 2018-11-28 ENCOUNTER — HOSPITAL ENCOUNTER (OUTPATIENT)
Dept: OCCUPATIONAL THERAPY | Age: 68
Setting detail: THERAPIES SERIES
Discharge: HOME | End: 2018-11-28
Payer: MEDICARE

## 2018-11-28 PROCEDURE — 97022 WHIRLPOOL THERAPY: CPT

## 2018-11-28 PROCEDURE — 97530 THERAPEUTIC ACTIVITIES: CPT

## 2018-11-28 PROCEDURE — 97110 THERAPEUTIC EXERCISES: CPT

## 2018-11-28 PROCEDURE — 97140 MANUAL THERAPY 1/> REGIONS: CPT

## 2018-12-03 ENCOUNTER — HOSPITAL ENCOUNTER (OUTPATIENT)
Dept: OCCUPATIONAL THERAPY | Age: 68
Setting detail: THERAPIES SERIES
Discharge: HOME | End: 2018-12-03
Payer: MEDICARE

## 2018-12-03 PROCEDURE — 97110 THERAPEUTIC EXERCISES: CPT

## 2018-12-03 PROCEDURE — 97530 THERAPEUTIC ACTIVITIES: CPT

## 2018-12-03 PROCEDURE — 97140 MANUAL THERAPY 1/> REGIONS: CPT

## 2018-12-03 PROCEDURE — 97022 WHIRLPOOL THERAPY: CPT

## 2018-12-05 ENCOUNTER — HOSPITAL ENCOUNTER (OUTPATIENT)
Dept: OCCUPATIONAL THERAPY | Age: 68
Setting detail: THERAPIES SERIES
Discharge: HOME | End: 2018-12-05
Payer: MEDICARE

## 2018-12-05 PROCEDURE — 97530 THERAPEUTIC ACTIVITIES: CPT

## 2018-12-05 PROCEDURE — 97140 MANUAL THERAPY 1/> REGIONS: CPT

## 2018-12-05 PROCEDURE — 97022 WHIRLPOOL THERAPY: CPT

## 2018-12-05 PROCEDURE — 97110 THERAPEUTIC EXERCISES: CPT

## 2018-12-05 NOTE — PROGRESS NOTES
OCCUPATIONAL THERAPY PROGRESS NOTE    [facility] [facility] [place]   Phone: [phone]   Fax: [phone]     Date:  2018  Initial Evaluation Date:  10/24/2018    Patient Name:  Hermes Rose    :  1950  Restrictions/Precautions:  Per protocol, low fall risk  Diagnosis:  Pip joint replacement R                                        Insurance/Certification information:  Medicare  Referring Physician:  Dr. Emmanuel Montez  Date of Surgery/Injury: 10/10/18  Visit# / total visits:   10th visit:  2018    Pain Level: moderate, aching, burning, pressure, sharp, throbbing, tight (pulling) and uncomfortable    Subjective: Seen 2/2 scheduled visits. Week 9 of protocol. \"Overall doing better. Finger still stiff, but bending some better. Mod pain cont's. Objective:  Updated POC to be completed. . Engaged in ROM per protocol and scar management. HEP education. INTERVENTION: COMPLETED REPS/TIME: SPECIFICS/COMMENTS:   Modality:      Fluidotherapy x 15 min    Paraffin      MHP      Estim      Manual Therapy:      Scar Massage x 15 min    Soft Tissue:  10 min    Therapeutic Ex/Activities:      AROM/PROM x 10  min AROM/PROM/Light CRC   Towel task x 10 reps    Shoulder arc      UBE   With finger around handle with no pressure   Putty x  Flexion Stretches   Small peg activity x 10 min                                                    Extension tube   For HS use to facilitate PIP extension   Other:      Digit cap   Scar and education   Finger flexion wrap provided. 2 x daily /15 min or as jaspal. Black splint   Daily use with light tasks     Assessment: Patient shows potential to progress with stated goals and will be educated on HEP and provided written handouts as needed throughout their care. Pt is making Fair progress toward stated plan of care.    -Rehab Potential: Good  -Requires OT Follow Up: Yes  -Time In: 230  -Time Out: 330  Timed Treatment Minutes: 60

## 2018-12-10 ENCOUNTER — HOSPITAL ENCOUNTER (OUTPATIENT)
Dept: OCCUPATIONAL THERAPY | Age: 68
Setting detail: THERAPIES SERIES
Discharge: HOME | End: 2018-12-10
Payer: MEDICARE

## 2018-12-10 PROCEDURE — 97022 WHIRLPOOL THERAPY: CPT

## 2018-12-10 PROCEDURE — 97530 THERAPEUTIC ACTIVITIES: CPT

## 2018-12-10 PROCEDURE — 97110 THERAPEUTIC EXERCISES: CPT

## 2018-12-10 PROCEDURE — 97140 MANUAL THERAPY 1/> REGIONS: CPT

## 2018-12-12 ENCOUNTER — TELEPHONE (OUTPATIENT)
Dept: ORTHOPEDIC SURGERY | Age: 68
End: 2018-12-12

## 2018-12-12 ENCOUNTER — HOSPITAL ENCOUNTER (OUTPATIENT)
Dept: OCCUPATIONAL THERAPY | Age: 68
Setting detail: THERAPIES SERIES
Discharge: HOME | End: 2018-12-12
Payer: MEDICARE

## 2018-12-12 DIAGNOSIS — M19.049 HAND ARTHRITIS: Primary | ICD-10-CM

## 2018-12-12 PROCEDURE — 97110 THERAPEUTIC EXERCISES: CPT | Performed by: OCCUPATIONAL THERAPIST

## 2018-12-12 PROCEDURE — 97140 MANUAL THERAPY 1/> REGIONS: CPT | Performed by: OCCUPATIONAL THERAPIST

## 2018-12-12 PROCEDURE — 97022 WHIRLPOOL THERAPY: CPT | Performed by: OCCUPATIONAL THERAPIST

## 2018-12-12 NOTE — PROGRESS NOTES
OCCUPATIONAL THERAPY PROGRESS NOTE    [facility]   Phone: [phone]   Fax: [phone]     Date:  2018  Initial Evaluation Date:  10/24/2018    Patient Name:  Hugh Oconnor    :  1950  Restrictions/Precautions:  Per protocol, low fall risk  Diagnosis:  Pip joint replacement R                                        Insurance/Certification information:  Medicare  Referring Physician:  Dr. Licha Gardner  Date of Surgery/Injury: 10/10/18  Visit# / total visits:   10th visit:  2018    Pain Level: moderate, aching, burning, pressure, sharp, throbbing, tight (pulling) and uncomfortable    Subjective:\" Doing ok, frustrated that finger doesn't bend much, Going to see doctor tomorrow, I may come back for more therapy if he wants me to. \"    Objective:  Updated POC to be completed. . Engaged in ROM per protocol and scar management. HEP education. INTERVENTION: COMPLETED REPS/TIME: SPECIFICS/COMMENTS:   Modality:      Fluidotherapy x 15 min    Paraffin      MHP      Estim      Manual Therapy:      Scar Massage x 15 min    Soft Tissue:  10 min    Therapeutic Ex/Activities:      AROM/PROM x 10  min AROM/PROM/Light CRC   Towel task x 10 reps    Shoulder arc      UBE   With finger around handle with no pressure   Putty x  Flexion Stretches   Small peg activity x 5 min    Rice grasp  x 5 min                                              Extension tube   For HS use to facilitate PIP extension   Other:      Digit cap   Scar and education   Finger flexion wrap provided. 2 x daily /15 min or as jaspal. Black splint   Daily use with light tasks     Assessment: Patient shows potential to progress with stated goals and will be educated on HEP and provided written handouts as needed throughout their care. Pt is making Fair progress toward stated plan of care.   Reassessment: increased active mcp ext/flex to 0/80 degrees, pip ext/flex at end of session

## 2018-12-13 ENCOUNTER — OFFICE VISIT (OUTPATIENT)
Dept: ORTHOPEDIC SURGERY | Age: 68
End: 2018-12-13

## 2018-12-13 VITALS
HEART RATE: 75 BPM | DIASTOLIC BLOOD PRESSURE: 69 MMHG | TEMPERATURE: 97.9 F | SYSTOLIC BLOOD PRESSURE: 112 MMHG | RESPIRATION RATE: 18 BRPM

## 2018-12-13 DIAGNOSIS — M19.049 HAND ARTHRITIS: Primary | ICD-10-CM

## 2018-12-13 PROCEDURE — 99024 POSTOP FOLLOW-UP VISIT: CPT | Performed by: ORTHOPAEDIC SURGERY

## 2018-12-13 NOTE — PROGRESS NOTES
HPI: Patient presents today 2 months s/p right index finger PIP joint silastic arthroplasty. Overall she is doing well. She has been going to therapy with good improvement. Physical Exam: incision well healed. No erythema or signs of infection. No drainage. Limited active flexion and extension secondary to discomfort. Otherwise NVI. X-rays in the office today: AP lateral obliques demonstrate a Silastic PIP joint arthroplasty to index finger with good alignment. No new fractures or dislocations. Joint remains well reduced. Impression office x-rays: Stable PIP joint arthroplasty index finger. Assessment: 2 months s/p right index finger PIP joint silastic arthroplasty    Plan:   Referral back to therapy for ROM as tolerated. No restrictions. Follow up PRN. All questions and concerns answered. I have seen and evaluated the patient and agree with the above assessment and plan on today's visit. I have performed the key components of the history and physical examination with significant findings of eight weeks postop index finger PIP joint silastic arthroplasty. Patient reports her preoperative pain is almost obliquely resolved. Her biggest complaint is stiffness. Patient is referred back to therapy to improve range of motion. Progress as tolerated. . I concur with the findings and plan as documented.     Gifford Councilman, MD  12/13/2018

## 2018-12-17 ENCOUNTER — APPOINTMENT (OUTPATIENT)
Dept: OCCUPATIONAL THERAPY | Age: 68
End: 2018-12-17
Payer: MEDICARE

## 2018-12-19 ENCOUNTER — APPOINTMENT (OUTPATIENT)
Dept: OCCUPATIONAL THERAPY | Age: 68
End: 2018-12-19
Payer: MEDICARE

## 2019-01-02 ENCOUNTER — HOSPITAL ENCOUNTER (OUTPATIENT)
Dept: OCCUPATIONAL THERAPY | Age: 69
Setting detail: THERAPIES SERIES
Discharge: HOME | End: 2019-01-02
Payer: MEDICARE

## 2019-01-02 PROCEDURE — 97022 WHIRLPOOL THERAPY: CPT

## 2019-01-02 PROCEDURE — 97110 THERAPEUTIC EXERCISES: CPT

## 2019-01-02 PROCEDURE — 97140 MANUAL THERAPY 1/> REGIONS: CPT

## 2019-01-04 ENCOUNTER — HOSPITAL ENCOUNTER (OUTPATIENT)
Dept: OCCUPATIONAL THERAPY | Age: 69
Setting detail: THERAPIES SERIES
End: 2019-01-04
Payer: MEDICARE

## 2019-01-07 ENCOUNTER — APPOINTMENT (OUTPATIENT)
Dept: OCCUPATIONAL THERAPY | Age: 69
End: 2019-01-07
Payer: MEDICARE

## 2019-01-09 ENCOUNTER — APPOINTMENT (OUTPATIENT)
Dept: OCCUPATIONAL THERAPY | Age: 69
End: 2019-01-09
Payer: MEDICARE

## 2019-01-25 ENCOUNTER — HOSPITAL ENCOUNTER (OUTPATIENT)
Dept: OCCUPATIONAL THERAPY | Age: 69
Setting detail: THERAPIES SERIES
Discharge: HOME | End: 2019-01-25
Payer: MEDICARE

## 2019-01-25 PROCEDURE — 97140 MANUAL THERAPY 1/> REGIONS: CPT

## 2019-01-25 PROCEDURE — 97110 THERAPEUTIC EXERCISES: CPT

## 2019-01-25 PROCEDURE — 97022 WHIRLPOOL THERAPY: CPT

## 2019-01-25 PROCEDURE — 97760 ORTHOTIC MGMT&TRAING 1ST ENC: CPT

## 2019-02-20 ENCOUNTER — APPOINTMENT (OUTPATIENT)
Dept: GENERAL RADIOLOGY | Age: 69
End: 2019-02-20
Payer: MEDICARE

## 2019-02-20 ENCOUNTER — HOSPITAL ENCOUNTER (EMERGENCY)
Age: 69
Discharge: HOME | End: 2019-02-20
Attending: EMERGENCY MEDICINE
Payer: MEDICARE

## 2019-02-20 VITALS
HEIGHT: 60 IN | RESPIRATION RATE: 18 BRPM | WEIGHT: 125 LBS | HEART RATE: 78 BPM | TEMPERATURE: 96.7 F | SYSTOLIC BLOOD PRESSURE: 130 MMHG | DIASTOLIC BLOOD PRESSURE: 91 MMHG | BODY MASS INDEX: 24.54 KG/M2 | OXYGEN SATURATION: 99 %

## 2019-02-20 DIAGNOSIS — R52 GENERALIZED BODY ACHES: Primary | ICD-10-CM

## 2019-02-20 DIAGNOSIS — Z87.39 HISTORY OF FIBROMYALGIA: ICD-10-CM

## 2019-02-20 DIAGNOSIS — G60.9 IDIOPATHIC PERIPHERAL NEUROPATHY: Chronic | ICD-10-CM

## 2019-02-20 LAB
ALBUMIN SERPL-MCNC: 4 G/DL (ref 3.5–5.2)
ALP BLD-CCNC: 116 U/L (ref 35–104)
ALT SERPL-CCNC: 27 U/L (ref 0–32)
ANION GAP SERPL CALCULATED.3IONS-SCNC: 11 MMOL/L (ref 7–16)
AST SERPL-CCNC: 34 U/L (ref 0–31)
BASOPHILS ABSOLUTE: 0.04 E9/L (ref 0–0.2)
BASOPHILS RELATIVE PERCENT: 0.8 % (ref 0–2)
BILIRUB SERPL-MCNC: 0.2 MG/DL (ref 0–1.2)
BUN BLDV-MCNC: 12 MG/DL (ref 8–23)
CALCIUM SERPL-MCNC: 9.7 MG/DL (ref 8.6–10.2)
CHLORIDE BLD-SCNC: 104 MMOL/L (ref 98–107)
CO2: 25 MMOL/L (ref 22–29)
CREAT SERPL-MCNC: 0.5 MG/DL (ref 0.5–1)
D DIMER: 444 NG/ML DDU
EKG ATRIAL RATE: 78 BPM
EKG P AXIS: 69 DEGREES
EKG P-R INTERVAL: 128 MS
EKG Q-T INTERVAL: 386 MS
EKG QRS DURATION: 84 MS
EKG QTC CALCULATION (BAZETT): 440 MS
EKG R AXIS: 45 DEGREES
EKG T AXIS: 39 DEGREES
EKG VENTRICULAR RATE: 78 BPM
EOSINOPHILS ABSOLUTE: 0.24 E9/L (ref 0.05–0.5)
EOSINOPHILS RELATIVE PERCENT: 4.6 % (ref 0–6)
GFR AFRICAN AMERICAN: >60
GFR NON-AFRICAN AMERICAN: >60 ML/MIN/1.73
GLUCOSE BLD-MCNC: 99 MG/DL (ref 74–99)
HCT VFR BLD CALC: 39.4 % (ref 34–48)
HEMOGLOBIN: 12.6 G/DL (ref 11.5–15.5)
IMMATURE GRANULOCYTES #: 0.02 E9/L
IMMATURE GRANULOCYTES %: 0.4 % (ref 0–5)
LIPASE: 32 U/L (ref 13–60)
LYMPHOCYTES ABSOLUTE: 1.54 E9/L (ref 1.5–4)
LYMPHOCYTES RELATIVE PERCENT: 29.4 % (ref 20–42)
MCH RBC QN AUTO: 26.8 PG (ref 26–35)
MCHC RBC AUTO-ENTMCNC: 32 % (ref 32–34.5)
MCV RBC AUTO: 83.8 FL (ref 80–99.9)
MONOCYTES ABSOLUTE: 0.59 E9/L (ref 0.1–0.95)
MONOCYTES RELATIVE PERCENT: 11.3 % (ref 2–12)
NEUTROPHILS ABSOLUTE: 2.81 E9/L (ref 1.8–7.3)
NEUTROPHILS RELATIVE PERCENT: 53.5 % (ref 43–80)
PDW BLD-RTO: 14.6 FL (ref 11.5–15)
PLATELET # BLD: 307 E9/L (ref 130–450)
PMV BLD AUTO: 9.9 FL (ref 7–12)
POTASSIUM SERPL-SCNC: 4.1 MMOL/L (ref 3.5–5)
PRO-BNP: 94 PG/ML (ref 0–125)
RBC # BLD: 4.7 E12/L (ref 3.5–5.5)
SODIUM BLD-SCNC: 140 MMOL/L (ref 132–146)
TOTAL PROTEIN: 7.1 G/DL (ref 6.4–8.3)
TROPONIN: <0.01 NG/ML (ref 0–0.03)
WBC # BLD: 5.2 E9/L (ref 4.5–11.5)

## 2019-02-20 PROCEDURE — 36415 COLL VENOUS BLD VENIPUNCTURE: CPT

## 2019-02-20 PROCEDURE — 96376 TX/PRO/DX INJ SAME DRUG ADON: CPT

## 2019-02-20 PROCEDURE — 6360000002 HC RX W HCPCS: Performed by: EMERGENCY MEDICINE

## 2019-02-20 PROCEDURE — 85025 COMPLETE CBC W/AUTO DIFF WBC: CPT

## 2019-02-20 PROCEDURE — 85378 FIBRIN DEGRADE SEMIQUANT: CPT

## 2019-02-20 PROCEDURE — 83880 ASSAY OF NATRIURETIC PEPTIDE: CPT

## 2019-02-20 PROCEDURE — 99284 EMERGENCY DEPT VISIT MOD MDM: CPT

## 2019-02-20 PROCEDURE — 2580000003 HC RX 258: Performed by: STUDENT IN AN ORGANIZED HEALTH CARE EDUCATION/TRAINING PROGRAM

## 2019-02-20 PROCEDURE — 6360000002 HC RX W HCPCS: Performed by: STUDENT IN AN ORGANIZED HEALTH CARE EDUCATION/TRAINING PROGRAM

## 2019-02-20 PROCEDURE — 93005 ELECTROCARDIOGRAM TRACING: CPT | Performed by: STUDENT IN AN ORGANIZED HEALTH CARE EDUCATION/TRAINING PROGRAM

## 2019-02-20 PROCEDURE — 96374 THER/PROPH/DIAG INJ IV PUSH: CPT

## 2019-02-20 PROCEDURE — 6370000000 HC RX 637 (ALT 250 FOR IP): Performed by: STUDENT IN AN ORGANIZED HEALTH CARE EDUCATION/TRAINING PROGRAM

## 2019-02-20 PROCEDURE — 80053 COMPREHEN METABOLIC PANEL: CPT

## 2019-02-20 PROCEDURE — 71045 X-RAY EXAM CHEST 1 VIEW: CPT

## 2019-02-20 PROCEDURE — 84484 ASSAY OF TROPONIN QUANT: CPT

## 2019-02-20 PROCEDURE — 83690 ASSAY OF LIPASE: CPT

## 2019-02-20 RX ORDER — CYCLOBENZAPRINE HCL 5 MG
5 TABLET ORAL 2 TIMES DAILY PRN
COMMUNITY
End: 2020-12-07

## 2019-02-20 RX ORDER — OXYCODONE HYDROCHLORIDE AND ACETAMINOPHEN 5; 325 MG/1; MG/1
1 TABLET ORAL EVERY 8 HOURS PRN
Qty: 9 TABLET | Refills: 0 | Status: SHIPPED | OUTPATIENT
Start: 2019-02-20 | End: 2019-02-23

## 2019-02-20 RX ORDER — FAMOTIDINE 20 MG/1
20 TABLET, FILM COATED ORAL 2 TIMES DAILY
COMMUNITY
End: 2021-01-29

## 2019-02-20 RX ORDER — GABAPENTIN 300 MG/1
300 CAPSULE ORAL ONCE
Status: COMPLETED | OUTPATIENT
Start: 2019-02-20 | End: 2019-02-20

## 2019-02-20 RX ORDER — ETODOLAC 400 MG/1
400 TABLET, FILM COATED ORAL 2 TIMES DAILY
COMMUNITY
End: 2020-12-07

## 2019-02-20 RX ORDER — KETOROLAC TROMETHAMINE 30 MG/ML
15 INJECTION, SOLUTION INTRAMUSCULAR; INTRAVENOUS ONCE
Status: COMPLETED | OUTPATIENT
Start: 2019-02-20 | End: 2019-02-20

## 2019-02-20 RX ORDER — TRIAMTERENE AND HYDROCHLOROTHIAZIDE 37.5; 25 MG/1; MG/1
1 TABLET ORAL DAILY
COMMUNITY

## 2019-02-20 RX ORDER — 0.9 % SODIUM CHLORIDE 0.9 %
1000 INTRAVENOUS SOLUTION INTRAVENOUS ONCE
Status: COMPLETED | OUTPATIENT
Start: 2019-02-20 | End: 2019-02-20

## 2019-02-20 RX ADMIN — SODIUM CHLORIDE 1000 ML: 9 INJECTION, SOLUTION INTRAVENOUS at 05:48

## 2019-02-20 RX ADMIN — KETOROLAC TROMETHAMINE 15 MG: 30 INJECTION, SOLUTION INTRAMUSCULAR at 05:50

## 2019-02-20 RX ADMIN — GABAPENTIN 300 MG: 300 CAPSULE ORAL at 05:49

## 2019-02-20 RX ADMIN — KETOROLAC TROMETHAMINE 15 MG: 30 INJECTION, SOLUTION INTRAMUSCULAR at 08:08

## 2019-02-20 ASSESSMENT — ENCOUNTER SYMPTOMS
VOMITING: 0
NAUSEA: 0
TROUBLE SWALLOWING: 0
ABDOMINAL PAIN: 0
EYE REDNESS: 0
WHEEZING: 0
DIARRHEA: 0
DOUBLE VISION: 0
SHORTNESS OF BREATH: 0
BACK PAIN: 0
SORE THROAT: 0
COUGH: 0

## 2019-02-20 ASSESSMENT — PAIN DESCRIPTION - DESCRIPTORS: DESCRIPTORS: ACHING

## 2019-02-20 ASSESSMENT — PAIN DESCRIPTION - LOCATION
LOCATION: OTHER (COMMENT)
LOCATION: GENERALIZED

## 2019-02-20 ASSESSMENT — PAIN DESCRIPTION - FREQUENCY: FREQUENCY: CONTINUOUS

## 2019-02-20 ASSESSMENT — PAIN SCALES - GENERAL
PAINLEVEL_OUTOF10: 10

## 2019-02-20 ASSESSMENT — PAIN DESCRIPTION - PAIN TYPE: TYPE: ACUTE PAIN

## 2019-02-20 ASSESSMENT — PAIN DESCRIPTION - ORIENTATION: ORIENTATION: RIGHT;LEFT

## 2019-02-20 ASSESSMENT — PAIN DESCRIPTION - ONSET: ONSET: ON-GOING

## 2019-02-20 ASSESSMENT — PAIN DESCRIPTION - PROGRESSION: CLINICAL_PROGRESSION: GRADUALLY WORSENING

## 2019-03-04 ENCOUNTER — HOSPITAL ENCOUNTER (OUTPATIENT)
Dept: GENERAL RADIOLOGY | Age: 69
Discharge: HOME | End: 2019-03-06
Payer: MEDICARE

## 2019-03-04 DIAGNOSIS — R13.13 CRICOPHARYNGEAL DYSPHAGIA: ICD-10-CM

## 2019-03-04 PROCEDURE — 74220 X-RAY XM ESOPHAGUS 1CNTRST: CPT

## 2019-09-04 ENCOUNTER — HOSPITAL ENCOUNTER (OUTPATIENT)
Age: 69
Discharge: HOME | End: 2019-09-06
Payer: MEDICARE

## 2019-09-04 PROCEDURE — 88175 CYTOPATH C/V AUTO FLUID REDO: CPT

## 2019-10-23 ENCOUNTER — HOSPITAL ENCOUNTER (OUTPATIENT)
Dept: GENERAL RADIOLOGY | Age: 69
Discharge: HOME | End: 2019-10-25
Payer: MEDICARE

## 2019-10-23 ENCOUNTER — HOSPITAL ENCOUNTER (OUTPATIENT)
Dept: GENERAL RADIOLOGY | Age: 69
End: 2019-10-23
Payer: MEDICARE

## 2019-10-23 DIAGNOSIS — R07.89 OTHER CHEST PAIN: ICD-10-CM

## 2019-10-23 DIAGNOSIS — N64.4 BREAST PAIN, LEFT: ICD-10-CM

## 2019-10-23 PROCEDURE — 76642 ULTRASOUND BREAST LIMITED: CPT

## 2020-03-05 ENCOUNTER — HOSPITAL ENCOUNTER (EMERGENCY)
Age: 70
Discharge: HOME | End: 2020-03-05
Attending: EMERGENCY MEDICINE
Payer: MEDICARE

## 2020-03-05 VITALS
WEIGHT: 126 LBS | OXYGEN SATURATION: 99 % | RESPIRATION RATE: 16 BRPM | BODY MASS INDEX: 24.61 KG/M2 | DIASTOLIC BLOOD PRESSURE: 92 MMHG | HEART RATE: 88 BPM | TEMPERATURE: 98 F | SYSTOLIC BLOOD PRESSURE: 158 MMHG

## 2020-03-05 LAB
ALBUMIN SERPL-MCNC: 4.3 G/DL (ref 3.5–5.2)
ALP BLD-CCNC: 66 U/L (ref 35–104)
ALT SERPL-CCNC: 14 U/L (ref 0–32)
ANION GAP SERPL CALCULATED.3IONS-SCNC: 15 MMOL/L (ref 7–16)
AST SERPL-CCNC: 18 U/L (ref 0–31)
BACTERIA: ABNORMAL /HPF
BASOPHILS ABSOLUTE: 0.03 E9/L (ref 0–0.2)
BASOPHILS RELATIVE PERCENT: 0.6 % (ref 0–2)
BILIRUB SERPL-MCNC: 0.2 MG/DL (ref 0–1.2)
BILIRUBIN URINE: NEGATIVE
BLOOD, URINE: NEGATIVE
BUN BLDV-MCNC: 10 MG/DL (ref 8–23)
CALCIUM SERPL-MCNC: 9.9 MG/DL (ref 8.6–10.2)
CHLORIDE BLD-SCNC: 104 MMOL/L (ref 98–107)
CLARITY: CLEAR
CO2: 24 MMOL/L (ref 22–29)
COLOR: YELLOW
CREAT SERPL-MCNC: 0.7 MG/DL (ref 0.5–1)
EKG ATRIAL RATE: 70 BPM
EKG P AXIS: 76 DEGREES
EKG P-R INTERVAL: 132 MS
EKG Q-T INTERVAL: 418 MS
EKG QRS DURATION: 86 MS
EKG QTC CALCULATION (BAZETT): 451 MS
EKG R AXIS: 79 DEGREES
EKG T AXIS: 35 DEGREES
EKG VENTRICULAR RATE: 70 BPM
EOSINOPHILS ABSOLUTE: 0.14 E9/L (ref 0.05–0.5)
EOSINOPHILS RELATIVE PERCENT: 2.9 % (ref 0–6)
GFR AFRICAN AMERICAN: >60
GFR NON-AFRICAN AMERICAN: >60 ML/MIN/1.73
GLUCOSE BLD-MCNC: 97 MG/DL (ref 74–99)
GLUCOSE URINE: NEGATIVE MG/DL
HCT VFR BLD CALC: 40.3 % (ref 34–48)
HEMOGLOBIN: 12.7 G/DL (ref 11.5–15.5)
IMMATURE GRANULOCYTES #: 0.03 E9/L
IMMATURE GRANULOCYTES %: 0.6 % (ref 0–5)
KETONES, URINE: NEGATIVE MG/DL
LEUKOCYTE ESTERASE, URINE: ABNORMAL
LYMPHOCYTES ABSOLUTE: 1.89 E9/L (ref 1.5–4)
LYMPHOCYTES RELATIVE PERCENT: 39.1 % (ref 20–42)
MCH RBC QN AUTO: 26.4 PG (ref 26–35)
MCHC RBC AUTO-ENTMCNC: 31.5 % (ref 32–34.5)
MCV RBC AUTO: 83.8 FL (ref 80–99.9)
MONOCYTES ABSOLUTE: 0.37 E9/L (ref 0.1–0.95)
MONOCYTES RELATIVE PERCENT: 7.7 % (ref 2–12)
NEUTROPHILS ABSOLUTE: 2.37 E9/L (ref 1.8–7.3)
NEUTROPHILS RELATIVE PERCENT: 49.1 % (ref 43–80)
NITRITE, URINE: NEGATIVE
PDW BLD-RTO: 13.9 FL (ref 11.5–15)
PH UA: 7 (ref 5–9)
PLATELET # BLD: 213 E9/L (ref 130–450)
PMV BLD AUTO: 10.6 FL (ref 7–12)
POTASSIUM REFLEX MAGNESIUM: 3.8 MMOL/L (ref 3.5–5)
PROTEIN UA: NEGATIVE MG/DL
RBC # BLD: 4.81 E12/L (ref 3.5–5.5)
RBC UA: ABNORMAL /HPF (ref 0–2)
SODIUM BLD-SCNC: 143 MMOL/L (ref 132–146)
SPECIFIC GRAVITY UA: <=1.005 (ref 1–1.03)
TOTAL CK: 57 U/L (ref 20–180)
TOTAL PROTEIN: 7.4 G/DL (ref 6.4–8.3)
TROPONIN: <0.01 NG/ML (ref 0–0.03)
UROBILINOGEN, URINE: 0.2 E.U./DL
WBC # BLD: 4.8 E9/L (ref 4.5–11.5)
WBC UA: ABNORMAL /HPF (ref 0–5)

## 2020-03-05 PROCEDURE — 82550 ASSAY OF CK (CPK): CPT

## 2020-03-05 PROCEDURE — 84484 ASSAY OF TROPONIN QUANT: CPT

## 2020-03-05 PROCEDURE — 81001 URINALYSIS AUTO W/SCOPE: CPT

## 2020-03-05 PROCEDURE — 85025 COMPLETE CBC W/AUTO DIFF WBC: CPT

## 2020-03-05 PROCEDURE — 99284 EMERGENCY DEPT VISIT MOD MDM: CPT

## 2020-03-05 PROCEDURE — 6370000000 HC RX 637 (ALT 250 FOR IP): Performed by: STUDENT IN AN ORGANIZED HEALTH CARE EDUCATION/TRAINING PROGRAM

## 2020-03-05 PROCEDURE — 93010 ELECTROCARDIOGRAM REPORT: CPT | Performed by: INTERNAL MEDICINE

## 2020-03-05 PROCEDURE — 93005 ELECTROCARDIOGRAM TRACING: CPT | Performed by: NURSE PRACTITIONER

## 2020-03-05 PROCEDURE — 80053 COMPREHEN METABOLIC PANEL: CPT

## 2020-03-05 RX ORDER — HYDROXYZINE HYDROCHLORIDE 25 MG/1
25 TABLET, FILM COATED ORAL NIGHTLY PRN
Qty: 10 TABLET | Refills: 0 | Status: SHIPPED | OUTPATIENT
Start: 2020-03-05 | End: 2020-03-15

## 2020-03-05 RX ORDER — HYDROXYZINE PAMOATE 25 MG/1
25 CAPSULE ORAL ONCE
Status: COMPLETED | OUTPATIENT
Start: 2020-03-05 | End: 2020-03-05

## 2020-03-05 RX ADMIN — HYDROXYZINE PAMOATE 25 MG: 25 CAPSULE ORAL at 23:25

## 2020-03-05 NOTE — ED NOTES
FIRST PROVIDER CONTACT ASSESSMENT NOTE      Department of Emergency Medicine   ED  First Provider Note   3/5/20  1:40 PM    Chief Complaint: Numbness (body wide intermittent numbness/tingling, hx lupus)      History of Present Illness:    Christian Joseph is a 77 y.o. female who presents to the ED by private car for numbness and tingling throughout entire body, hx Lupus  Focused Screening Exam:  Constitutional:  Alert, appears stated age and is in no distress.       *ALLERGIES*     Iodine; Prednisone; Scopolamine; and Demerol     ED Triage Vitals [03/05/20 1322]   BP Temp Temp src Pulse Resp SpO2 Height Weight   -- -- -- 88 -- 99 % -- --        Initial Plan of Care:  Initiate Treatment-Testing, Proceed toTreatment Area When Bed Available for ED Attending/MLP to Continue Care    -----------------END OF FIRST PROVIDER CONTACT ASSESSMENT NOTE--------------  Electronically signed by BRIANA Turcios NP   DD: 3/5/20       BRIANA Stone NP  03/05/20 3209

## 2020-03-06 ASSESSMENT — ENCOUNTER SYMPTOMS
ABDOMINAL PAIN: 0
NAUSEA: 0
BLOOD IN STOOL: 0
COUGH: 0
RHINORRHEA: 0
VOMITING: 0
SHORTNESS OF BREATH: 0
CONSTIPATION: 0
DIARRHEA: 0

## 2020-03-06 NOTE — ED PROVIDER NOTES
Patient is a 72-year-old female with history of SLE who presents to the emergency department with complaint of numbness and tingling sensation to bilateral legs as well as upper extremities. Patient states she has had this chronic problem for years however this morning it was more pronounced than normal.  Patient states numbness to the bilateral legs with left greater than right isolated to the lateral thigh, and tingling sensation to bilateral legs and bilateral arms with hands. Patient follows with rheumatology at [facility].  No new medications, no changes to her medications. She denies chest pain, shortness of breath, headache, nausea, vomiting, diarrhea, travel, trauma. Patient without any recent illness. She is ambulating and arrived POV to the hospital.        Review of Systems   Constitutional: Negative for appetite change, chills and fever. HENT: Negative for congestion and rhinorrhea. Respiratory: Negative for cough and shortness of breath. Cardiovascular: Negative for chest pain, palpitations and leg swelling. Gastrointestinal: Negative for abdominal pain, blood in stool, constipation, diarrhea, nausea and vomiting. Genitourinary: Negative for dysuria, frequency, hematuria and urgency. Musculoskeletal: Negative for arthralgias and myalgias. Skin: Negative for rash. Neurological: Positive for numbness. Negative for dizziness, syncope, weakness and headaches. Psychiatric/Behavioral: Negative for confusion. The patient is not nervous/anxious. All other systems reviewed and are negative. Physical Exam  Constitutional:       General: She is not in acute distress. Appearance: Normal appearance. She is not ill-appearing or toxic-appearing. HENT:      Head: Normocephalic and atraumatic. Mouth/Throat:      Mouth: Mucous membranes are moist.      Pharynx: Oropharynx is clear. Eyes:      Extraocular Movements: Extraocular movements intact.       Pupils: Pupils Glucose 97 74 - 99 mg/dL    BUN 10 8 - 23 mg/dL    CREATININE 0.7 0.5 - 1.0 mg/dL    GFR Non-African American >60 >=60 mL/min/1.73    GFR African American >60     Calcium 9.9 8.6 - 10.2 mg/dL    Total Protein 7.4 6.4 - 8.3 g/dL    Alb 4.3 3.5 - 5.2 g/dL    Total Bilirubin 0.2 0.0 - 1.2 mg/dL    Alkaline Phosphatase 66 35 - 104 U/L    ALT 14 0 - 32 U/L    AST 18 0 - 31 U/L   CBC Auto Differential   Result Value Ref Range    WBC 4.8 4.5 - 11.5 E9/L    RBC 4.81 3.50 - 5.50 E12/L    Hemoglobin 12.7 11.5 - 15.5 g/dL    Hematocrit 40.3 34.0 - 48.0 %    MCV 83.8 80.0 - 99.9 fL    MCH 26.4 26.0 - 35.0 pg    MCHC 31.5 (L) 32.0 - 34.5 %    RDW 13.9 11.5 - 15.0 fL    Platelets 056 065 - 688 E9/L    MPV 10.6 7.0 - 12.0 fL    Neutrophils % 49.1 43.0 - 80.0 %    Immature Granulocytes % 0.6 0.0 - 5.0 %    Lymphocytes % 39.1 20.0 - 42.0 %    Monocytes % 7.7 2.0 - 12.0 %    Eosinophils % 2.9 0.0 - 6.0 %    Basophils % 0.6 0.0 - 2.0 %    Neutrophils Absolute 2.37 1.80 - 7.30 E9/L    Immature Granulocytes # 0.03 E9/L    Lymphocytes Absolute 1.89 1.50 - 4.00 E9/L    Monocytes Absolute 0.37 0.10 - 0.95 E9/L    Eosinophils Absolute 0.14 0.05 - 0.50 E9/L    Basophils Absolute 0.03 0.00 - 0.20 E9/L   Troponin   Result Value Ref Range    Troponin <0.01 0.00 - 0.03 ng/mL   Urinalysis, reflex to microscopic   Result Value Ref Range    Color, UA Yellow Straw/Yellow    Clarity, UA Clear Clear    Glucose, Ur Negative Negative mg/dL    Bilirubin Urine Negative Negative    Ketones, Urine Negative Negative mg/dL    Specific Gravity, UA <=1.005 1.005 - 1.030    Blood, Urine Negative Negative    pH, UA 7.0 5.0 - 9.0    Protein, UA Negative Negative mg/dL    Urobilinogen, Urine 0.2 <2.0 E.U./dL    Nitrite, Urine Negative Negative    Leukocyte Esterase, Urine SMALL (A) Negative   CK   Result Value Ref Range    Total CK 57 20 - 180 U/L   Microscopic Urinalysis   Result Value Ref Range    WBC, UA 0-1 0 - 5 /HPF    RBC, UA NONE 0 - 2 /HPF    Bacteria, well as the importance of follow-up. Discharge Medication List as of 3/5/2020 10:42 PM      START taking these medications    Details   hydrOXYzine (ATARAX) 25 MG tablet Take 1 tablet by mouth nightly as needed (tingling), Disp-10 tablet, R-0Print             Diagnosis:  1. Numbness and tingling        Disposition:  Patient's disposition: Discharge to home  Patient's condition is stable.     3/5/20, 10:15 PM.    This note is prepared by Mary Gill MD -PGY-1             Mary Gill MD  Resident  03/06/20 9023

## 2020-03-06 NOTE — ED NOTES
Bed: [facility]  Expected date:   Expected time:   Means of arrival:   Comments:  Triage      Jayden Valdez RN  03/05/20 2031

## 2020-12-07 DIAGNOSIS — N92.5 OTHER SPECIFIED IRREGULAR MENSTRUATION: ICD-10-CM

## 2020-12-07 DIAGNOSIS — N95.0 POST-MENOPAUSAL BLEEDING: ICD-10-CM

## 2020-12-07 LAB
HCT VFR BLD CALC: 40.2 % (ref 34–48)
HEMOGLOBIN: 13 G/DL (ref 11.5–15.5)
MCH RBC QN AUTO: 27.7 PG (ref 26–35)
MCHC RBC AUTO-ENTMCNC: 32.3 % (ref 32–34.5)
MCV RBC AUTO: 85.5 FL (ref 80–99.9)
PDW BLD-RTO: 13.7 FL (ref 11.5–15)
PLATELET # BLD: 228 E9/L (ref 130–450)
PMV BLD AUTO: 11.6 FL (ref 7–12)
PROLACTIN: 19.68 NG/ML
RBC # BLD: 4.7 E12/L (ref 3.5–5.5)
TSH SERPL DL<=0.05 MIU/L-ACNC: 0.23 UIU/ML (ref 0.27–4.2)
WBC # BLD: 4.6 E9/L (ref 4.5–11.5)

## 2020-12-10 LAB — T4 FREE: 1.57 NG/DL (ref 0.93–1.7)

## 2020-12-12 LAB — T3 FREE: 2.6 PG/ML (ref 2–4.4)

## 2021-02-09 ENCOUNTER — HOSPITAL ENCOUNTER (OUTPATIENT)
Age: 71
Discharge: HOME | End: 2021-02-11

## 2021-02-09 PROCEDURE — 88305 TISSUE EXAM BY PATHOLOGIST: CPT

## 2022-04-21 ENCOUNTER — HOSPITAL ENCOUNTER (OUTPATIENT)
Age: 72
Discharge: HOME | End: 2022-04-23

## 2022-04-21 ENCOUNTER — OUTSIDE SERVICES (OUTPATIENT)
Dept: OBGYN | Age: 72
End: 2022-04-21

## 2022-04-21 DIAGNOSIS — R93.5 ABNORMAL ULTRASOUND OF ENDOMETRIUM: ICD-10-CM

## 2022-04-21 DIAGNOSIS — Z98.890 S/P DILATION AND CURETTAGE: ICD-10-CM

## 2022-04-21 DIAGNOSIS — N95.0 PMB (POSTMENOPAUSAL BLEEDING): Primary | ICD-10-CM

## 2022-04-21 PROCEDURE — 88305 TISSUE EXAM BY PATHOLOGIST: CPT

## 2022-04-21 PROCEDURE — 88112 CYTOPATH CELL ENHANCE TECH: CPT

## 2022-04-21 NOTE — OP NOTE
[facility] [address] [place] [address]    Outpatient Post Op Note D/C   Signed    Patient: Freddie Chavarria         MR#: [id number]    : 1950         Acct:[id number]    Age/Sex: 80 / F         ADM Date: 22    Loc: HS.OR                 Provider Location: [facility]     cc: Dr. Elizabeth Westfall; Dr. Daniel Bernal      Date of Procedure:   22    Surgeon:   Elizabeth Westfall MD    Preoperative Diagnosis:   1. Postmenopausal bleeding  2. Abnormal endometrial ultrasound  3. Cervical stenosis    Post-Op Diagnosis:   Same, pathology pending    Operative Procedure:   1. Video hysteroscopy  2. Dilation and curettage    Implants: No    General Anesthesia Type: General    Fluid Balance:   Intravenous Fluid In: 900 mL crystalloid  Hysteroscopic Fluid In: 1050 mL; hysteroscopic Fluid Out: 1000 mL; Hysteroscopic Net Fluid Balance: -50 mL    Urine Output:  Not measured. Drains: None. Intraoperative Medications:   None    Indication for Procedure: 69-year-old female  3 para 2 AB 1 who presented 2022 with the complaint of postmenopausal bleeding. Patient had a history of postmenopausal bleeding at the end of  and underwent a hysteroscopy, D&C with polypectomy on 2021. Pathology showed fragments of a little benign cervical tissue. Patient had no bleeding afterwards until the last 3 months of . The bleeding was bright red. It occurred once with wiping. She was scheduled for a pelvic ultrasound on 2022 which showed endometrial thickness of 0.9 mm. There was a small amount of fluid present within the endometrial cavity. Patient was scheduled for saline infused sonogram and endometrial biopsy on 2022 which was not possible in the office secondary to cervical stenosis and patient intolerance to the examination.   A transvaginal ultrasound that day showed that the fluid was still present within the endometrial cavity and there were findings suspicious for a 1.2 x 1.1 mm posterior wall intrauterine polyp. Management options were reviewed. The fact that hysteroscopy with D&C could be both diagnostic and therapeutic was also discussed. The risks specific to this surgery discussed included, but were not limited to that of anesthesia, infection, hemorrhage, transfusion, uterine perforation, cervical laceration, bowel/bladder/ vascular/ureteral injury and any corrective surgeries to repair said injuries to bowel, bladder, vascular, and ureters as well as deep vein thrombosis, pulmonary embolism, pain, and death. Questions were answered to the patient's satisfaction. Informed consent was obtained to proceed with hysteroscopy with dilatation and curettage as planned. Findings: Exam under anesthesia revealed normal external female genitalia with moist, pink vaginal mucosa. Normal appearing rugae. There was no discharge or odor. Cervix is nulliparous and stenotic with no gross lesions. Uterus is normal in size and anteverted. The adnexa are free with no palpable masses. Bartholin's, urethral, and Ducor's glands are normal. Pelvic support was adequate. Anus was patent with good sphincter tone. Hysteroscopic findings: Once the cervical stenosis was dilated, a small amount of dark, old blood drained from the uterine cavity. The liquid was collected and sent for cytology. The uterus was sounded to 6 cm in an anteverted direction. The endocervical canal was smooth. The bilateral ostia were visualized and appeared normal. The uterine cavity was of normal size, shape, and contour. The endometrium itself was very thin, pale and atrophic. There was some hemosiderin staining within the uterine cavity, aside from this the endometrial cavity was flushed with normal saline, the endometrium appeared unremarkable, without polyp or other intrauterine abnormalities visible. No abnormal vasculature was visualized.  Post curettage revealed no submucosal lesions. Pictures were taken throughout the course of the procedure for the permanent record. Condition:  The patient was transferred to recovery room in awake stable postoperative state  . Description of Procedure: The patient was taken to the operating room with intravenous line running. She was placed on the operating room table in the dorsal supine position. General anesthesia was administered without difficulty. She was then placed in dorsal lithotomy position. She was prepped with a Hibiclens solution (Betadine allergy) and draped in the usual sterile fashion. Examination under anesthesia was performed with findings as mentioned above. A weighted Ogden speculum was placed in the patient´s posterior vagina at a right angle to the anterior vagina which revealed the anterior lip of the cervix. This was grasped with a single-tooth tenaculum. The smallest Sunny cervical dilator was used to gently probe the endocervical canal.  Using gentle pressure the stenosis was alleviated, a small amount of old, dark, brown blood drained from the uterine cavity. The uterus was then gently sounded to the level of the uterine fundus in an anteverted direction to a depth of 6 cm. The cervix was then serially dilated in a systematic fashion using 2Duche cervical dilators to size 21-Telugu. The 2.9 mm iPosi operative hysteroscope was then assembled and white balanced. It was gently introduced into the uterine cavity using normal saline as a distending medium under direct visualization of the camera. The bilateral ostia visualized and appeared normal.  Endocervical canal smooth. No endometrial cavity abnormalities were visualized. The hysteroscope was then slowly withdrawn. The small-sized #1 sharp curette was then gently introduced into uterine cavity, to the level of the uterine fundus.  A sharp curettage was performed in a four-quadrant fashion covering all directions until a gritty texture was noted and good uterine cry was felt throughout the uterus. A moderate amount of specimen was collected and sent to pathology for permanent section. A second look with the hysteroscope confirmed adequate curettage had been performed. There was no evidence of residual abnormalities visualized. There was minimal bleeding noted. All instrumentation was removed from the patient's vagina. The tenaculum sites noted to be hemostatic. Once hemostasis was confirmed the procedure was terminated. The patient was returned to the dorsal supine position and anesthesia was reversed without difficulty. She'll be discharged home with instructions follow-up in the office 2 weeks' time. She is to call for fever, severe abdominal pain, bleeding heavier than a period or questions or concerns. She will be on complete pelvic rest of the next 2 weeks and will avoid sex, tampons, douching, tub bathing and swimming. She may otherwise gradually resume all other normal activities in the next 24-48 hours. She may use over the counter Tylenol and ibuprofen as needed for postop pain management. Complications: No    Specimens Removed: Yes (A. Intrauterine fluid for cytology; B.  Endometrial curettings)    EBL (ml): 5    Discharge Outpatient Surgery    - Follow up Plan  Discharge Follow Up: 2 Weeks (May 4, 2022 at 3:00 PM)    - Discharge Instructions  Instructions:  Discharge Instructions D&C [facility] Post-Operative Instructions  Additional Instructions:   Use over-the-counter ibuprofen every 6-8 hours as needed for pain postoperatively. You may supplement between doses of ibuprofen with Tylenol as needed. Avoid sex, tampons, douching, tubs,hot tubs,swimming(nothing in the vagina) for 2 weeks. You may shower only. You may gradually resume all other normal activities in 24-48 hours.     Call for fever, severe pain, bleeding heavier than a normal period or questions or concerns.     Office:[phone]    After Hours:[phone]  Shower: [phone] [employer]    Dictated By: Dr. Alice Beth     Signed By: <Electronically signed by Dr. Alice Beth   04/21/22 1037        DD/DT: 04/21/22 1682

## 2022-06-03 ENCOUNTER — HOSPITAL ENCOUNTER (EMERGENCY)
Age: 72
Discharge: HOME | End: 2022-06-03
Payer: MEDICARE

## 2022-06-03 VITALS
TEMPERATURE: 97.6 F | BODY MASS INDEX: 22.58 KG/M2 | SYSTOLIC BLOOD PRESSURE: 127 MMHG | OXYGEN SATURATION: 99 % | DIASTOLIC BLOOD PRESSURE: 63 MMHG | RESPIRATION RATE: 15 BRPM | HEIGHT: 60 IN | HEART RATE: 77 BPM | WEIGHT: 115 LBS

## 2022-06-03 DIAGNOSIS — S05.02XA ABRASION OF LEFT CORNEA, INITIAL ENCOUNTER: Primary | ICD-10-CM

## 2022-06-03 PROCEDURE — 6370000000 HC RX 637 (ALT 250 FOR IP): Performed by: PHYSICIAN ASSISTANT

## 2022-06-03 PROCEDURE — 99283 EMERGENCY DEPT VISIT LOW MDM: CPT

## 2022-06-03 RX ORDER — TOBRAMYCIN 3 MG/ML
1 SOLUTION/ DROPS OPHTHALMIC EVERY 6 HOURS
Qty: 5 ML | Refills: 0 | Status: SHIPPED | OUTPATIENT
Start: 2022-06-03 | End: 2022-06-13

## 2022-06-03 RX ORDER — TETRACAINE HYDROCHLORIDE 5 MG/ML
2 SOLUTION OPHTHALMIC ONCE
Status: COMPLETED | OUTPATIENT
Start: 2022-06-03 | End: 2022-06-03

## 2022-06-03 RX ADMIN — TETRACAINE HYDROCHLORIDE 2 DROP: 5 SOLUTION/ DROPS OPHTHALMIC at 18:57

## 2022-06-03 RX ADMIN — FLUORESCEIN SODIUM 1 EACH: 0.6 STRIP OPHTHALMIC at 18:57

## 2022-06-03 ASSESSMENT — PAIN SCALES - GENERAL: PAINLEVEL_OUTOF10: 6

## 2022-06-03 ASSESSMENT — PAIN - FUNCTIONAL ASSESSMENT: PAIN_FUNCTIONAL_ASSESSMENT: 0-10

## 2022-06-03 NOTE — ED PROVIDER NOTES
FIRST PROVIDER CONTACT ASSESSMENT NOTE           Department of Emergency Medicine                 First Provider Note            6/3/22  6:00 PM EDT    Date of Encounter: No admission date for patient encounter. Patient Name: Reginald Hoffman  : 1950  MRN: [id number]    Chief Complaint: Eye Problem (left eye itching and burning, slight swelling x 2 days )      History of Present Illness:   Reginald Hoffman is a 80 y.o. female who presents to the ED for left eye is itching and burning. Onset yesterday. Denies injury or trauma. States she does not wear contact. Denies redness or rash      Focused Physical Exam:  VS:    ED Triage Vitals   BP Temp Temp src Pulse Resp SpO2 Height Weight   -- -- -- -- -- -- -- --        Physical Ex: Constitutional: Alert and non-toxic. Medical History:  has a past medical history of Acid reflux, Acquired hypothyroidism, Arthritis, Back problem, Chronic left-sided low back pain with sciatica, Chronic right hip pain, Dizzy, Finger pain, right, Foot cramps, Hyperlipidemia, Hypothyroidism, Idiopathic peripheral neuropathy, Lupus (HCC), Neuropathy of left lower extremity, PONV (postoperative nausea and vomiting), Stomach pain, Thyroid disease, and Wears glasses. Surgical History:  has a past surgical history that includes Tubal ligation; Uterine fibroid surgery; Colonoscopy (2012); Upper gastrointestinal endoscopy; pr arthroplasty i-p jt (Right, 10/10/2018); Dilation and curettage of uterus (2021); and Dilation and curettage of uterus (2022). Social History:  reports that she is a non-smoker but has been exposed to tobacco smoke. She has never used smokeless tobacco. She reports that she does not drink alcohol and does not use drugs. Family History: family history includes Heart Disease in her mother; Other in her father.     Allergies: Iodine, Prednisone, Scopolamine, and Demerol     Initial Plan of Care: Initiate Treatment-Testing, Proceed toTreatment Area When Bed Available for ED Attending/MLP to Continue Care      ---END OF FIRST PROVIDER CONTACT ASSESSMENT NOTE---  Electronically signed by Austin Lara PA-C   DD: 6/3/22       Austin Lara PA-C  06/03/22 1800       Austin Lara PA-C  06/03/22 1803

## 2022-06-04 NOTE — ED PROVIDER NOTES
ED Attending shared visit  [facility]  [facility]   ED  Encounter Note  Admit Date/RoomTime: 6/3/2022  6:20 PM  ED Room: /    NAME: Jodi Noble  : 1950  MRN: [id number]     Chief Complaint:  Eye Problem (left eye itching and burning, slight swelling x 2 days )    History of Present Illness        Jodi Noble is a 80 y.o. old female presenting to the emergency department by private vehicle, for non-traumatic gradual onset, constant, worsening blurred vision and pain to left eye, which began 1 week(s) prior to arrival.  There has been no obvious mechanism causing complaint. Since onset her symptoms have been gradually worsening and mild in severity. Associated signs & symptoms of: nothing additional. The patients tetanus status is up to date. Pt wears no contacts. She reports she thought she had allergies because she had some sinus congestion and was using Claritin but it was not helping. At first she reports there was no pain just watering eyes. Pain started yesterday and she has pain around the eye now as well. Circumstances:    []  Contact Lens Use     []  Recent URI Sx's     []  Spontaneous Onset     []  Close Contact w/similar Sx's     []  Work Related     History of:     []   Glaucoma     []   Recent Eye Surgery     ROS   Pertinent positives and negatives are stated within HPI, all other systems reviewed and are negative. Past Medical History:  has a past medical history of Acid reflux, Acquired hypothyroidism, Arthritis, Back problem, Chronic left-sided low back pain with sciatica, Chronic right hip pain, Dizzy, Finger pain, right, Foot cramps, Hyperlipidemia, Hypothyroidism, Idiopathic peripheral neuropathy, Lupus (HCC), Neuropathy of left lower extremity, PONV (postoperative nausea and vomiting), Stomach pain, Thyroid disease, and Wears glasses.     Surgical History:  has a past surgical history that includes Tubal ligation; Uterine fibroid surgery; Colonoscopy (06/06/2012); Upper gastrointestinal endoscopy; pr arthroplasty i-p jt (Right, 10/10/2018); Dilation and curettage of uterus (02/09/2021); and Dilation and curettage of uterus (04/21/2022). Social History:  reports that she is a non-smoker but has been exposed to tobacco smoke. She has never used smokeless tobacco. She reports that she does not drink alcohol and does not use drugs. Family History: family history includes Heart Disease in her mother; Other in her father. Allergies: Iodine, Prednisone, Scopolamine, and Demerol    Physical Exam   Oxygen Saturation Interpretation: Normal.        ED Triage Vitals [06/03/22 1800]   BP Temp Temp src Heart Rate Resp SpO2 Height Weight   127/63 97.6 °F (36.4 °C) -- 77 15 99 % 5' (1.524 m) 115 lb (52.2 kg)         Constitutional:  Alert, development consistent with age. HENT:  NC/NT. Airway patent. Neck:  Normal ROM. Supple. Eyes:         Pupils: equal, round, reactive to light and accommodation. Eyelids: Bilateral upper and lower Swelling/redness:  no swelling or erythema. Eyelids everted no FB noted. Conjunctiva: Left mild injection. Sclera: Bilateral normal appearing. Cornea: Left an abrasion is present which is approximately horizontally across the central of cornea and fuzzy edged and pterygium was present at the 9 o'clock position. EOM:  Intact Bilaterally. Fundoscopic:  not well visualized. Visual Acuity:  Within Normal Limit. 20/40 all. Integument:  No rashes, erythema present, unless noted elsewhere. Lymphatics: No lymphangitis or adenopathy noted. Neurological:  Oriented. Motor functions intact. Lab / Imaging Results   (All laboratory and radiology results have been personally reviewed by myself)  Labs:  No results found for this visit on 06/03/22. Imaging: All Radiology results interpreted by Radiologist unless otherwise noted.   No orders to display       ED Course / Medical Decision Making     Medications   tetracaine (TETRAVISC) 0.5 % ophthalmic solution 2 drop (2 drops Left Eye Given 6/3/22 1857)   fluorescein ophthalmic strip 1 each (1 each Left Eye Given 6/3/22 1857)        Re-examination:  6/3/22       Time: immediate improvement with tetracaine drop    Consult(s):   None    Procedure(s):  SLIT LAMP EXAM:  Performed By: Isela Gaoan PA-C. Left Eye: Cornea: an abrasion is present which is approximately 8 mm at the horizontally across central cornea. Flourescein stain: Positive. Anterior chamber: no abnormalities were observed. Dr. Reji Chacon performed a bedside ultrasound of the eye and no abnormalities were observed at the retina    MDM:   Pt presents to ED with eye pain x 2 days, started with watering eyes and sinus congestion 1 week ago, pt thought it was allergies but pain in eye developed. She denies getting anything in her eye or rubbing her eye. There is a corneal abrasion across the left cornea, bedside ultrasound of the eye done by Dr. Reji Chacon was normal. Pt visual acuity is normal. Pt will be started on antibiotic drops and she already has an appointment with her ophthalmologist on  Monday, advised pt to return to ED if she develops rash on her face. Plan of Care/Counseling:  Isela Gaona PA-C and EM Attending Physician reviewed today's visit with the patient in addition to providing specific details for the plan of care and counseling regarding the diagnosis and prognosis. Questions are answered at this time and are agreeable with the plan. Assessment      1. Abrasion of left cornea, initial encounter      Plan   Discharged home.   Patient condition is good    New Medications     Discharge Medication List as of 6/3/2022  7:18 PM      START taking these medications    Details   tobramycin (TOBREX) 0.3 % ophthalmic solution Place 1 drop into the left eye every 6 hours for 10 days, Disp-5 mL, R-0Normal           Electronically signed by Michael Menjivar PA-C   DD: 6/3/22  **This report was transcribed using voice recognition software. Every effort was made to ensure accuracy; however, inadvertent computerized transcription errors may be present.   END OF ED PROVIDER NOTE       Michael Menjivar PA-C  06/04/22 0001

## 2022-06-29 ENCOUNTER — HOSPITAL ENCOUNTER (OUTPATIENT)
Dept: ULTRASOUND IMAGING | Age: 72
Discharge: HOME | End: 2022-07-01
Payer: MEDICARE

## 2022-06-29 DIAGNOSIS — I73.9 PERIPHERAL VASCULAR DISEASE, UNSPECIFIED (HCC): ICD-10-CM

## 2022-06-29 PROCEDURE — 93925 LOWER EXTREMITY STUDY: CPT

## 2022-06-29 PROCEDURE — 93925 LOWER EXTREMITY STUDY: CPT | Performed by: RADIOLOGY

## 2022-10-25 ENCOUNTER — HOSPITAL ENCOUNTER (OUTPATIENT)
Dept: PHYSICAL THERAPY | Age: 72
Setting detail: THERAPIES SERIES
Discharge: HOME | End: 2022-10-25
Payer: MEDICARE

## 2022-10-25 PROCEDURE — 97161 PT EVAL LOW COMPLEX 20 MIN: CPT | Performed by: PHYSICAL THERAPIST

## 2022-10-25 ASSESSMENT — PAIN DESCRIPTION - PAIN TYPE: TYPE: CHRONIC PAIN

## 2022-10-25 ASSESSMENT — PAIN - FUNCTIONAL ASSESSMENT: PAIN_FUNCTIONAL_ASSESSMENT: PREVENTS OR INTERFERES WITH MANY ACTIVE NOT PASSIVE ACTIVITIES

## 2022-10-25 ASSESSMENT — PAIN DESCRIPTION - LOCATION: LOCATION: KNEE

## 2022-10-25 ASSESSMENT — PAIN DESCRIPTION - ORIENTATION: ORIENTATION: RIGHT;LEFT

## 2022-10-25 ASSESSMENT — PAIN SCALES - GENERAL: PAINLEVEL_OUTOF10: 7

## 2022-10-25 ASSESSMENT — PAIN DESCRIPTION - DESCRIPTORS: DESCRIPTORS: ACHING;BURNING

## 2022-10-25 NOTE — PROGRESS NOTES
[address] [facility]                Phone: [phone]   Fax: [phone]    Physical Therapy Daily Treatment Note  Date:  10/25/2022    Patient Name:  Krissy Park    :  1950  MRN: [id number]    Evaluating therapist:  BEATRIZ Bustos                (10/25/22)  Restrictions/Precautions:    Diagnosis:  OA B knees   Treatment Diagnosis:    Insurance/Certification information:  [facility] Medicare               cert dates:    to  23               ICD-10:  M17.9  Referring Practitioner:  Shania Amador Plan of care signed (Y/N):  Y  Visit# / total visits:    Pain level: 7/10   Time In:  Time Out:    Subjective:      Exercises:  Exercise/Equipment Resistance/Repetitions Other comments   StepOne              tball flex/rot            sit/stand            NK flex/ext             toe raises     step ups             side                                                               Other Therapeutic Activities:      Home Exercise Program:  provided 10/25/22    Manual Treatments:      Modalities:  MH to B knees PRN     Timed Code Treatment Minutes: Total Treatment Minutes:      Treatment/Activity Tolerance:  [] Patient tolerated treatment well [] Patient limited by fatique  [] Patient limited by pain  [] Patient limited by other medical complications  [] Other:     Prognosis: [] Good [] Fair  [] Poor    Patient Requires Follow-up: [] Yes  [] No    Plan:   [] Continue per plan of care [] Alter current plan (see comments)  [] Plan of care initiated [] Hold pending MD visit [] Discharge  Plan for Next Session:      See Weekly Progress Note: []  Yes  []  No  Next due:        Electronically signed by:   Kayley Land PT

## 2022-10-25 NOTE — PROGRESS NOTES
Physical Therapy: Initial Evaluation    Patient: Herminio Chavarria (31 y.o. female)   Examination Date: 43/15/4315  Plan of Care Certification Period: 10/25/2022 to        :  1950 ;    Confirmed: Yes MRN: [id number]  CSN: [id number]   Insurance: Payor:  [facility] / Plan: 1978 / Product Type: *No Product type* /   Insurance ID: [id number] - (Medicare Managed) Secondary Insurance (if applicable):    Referring Physician: BRIANA Silva - CNP     PCP: Eddie Pedersen DO Visits to Date/Visits Approved:     No Show/Cancelled Appts:   /       Medical Diagnosis: Osteoarthritis of knee, unspecified [M17.9]    Treatment Diagnosis:       PERTINENT MEDICAL HISTORY           Medical History:     Past Medical History:   Diagnosis Date    Acid reflux     Acquired hypothyroidism 11/15/2018    Arthritis     hands    Back problem     history of    Chronic left-sided low back pain with sciatica 11/15/2018    Chronic right hip pain 11/15/2018    Dizzy     states retains fluid in my head, takes maxzide to treat    Finger pain, right 10/2018    index    Foot cramps 11/15/2018    Worse at night    Hyperlipidemia     Hypothyroidism     Idiopathic peripheral neuropathy 11/15/2018    Abnl.  NCS study, dx with sensory peripheral neuropathy, hx hypothyroidism    Lupus ([address]     Neuropathy of left lower extremity     PONV (postoperative nausea and vomiting)     Stomach pain     Thyroid disease     Wears glasses      Surgical History:   Past Surgical History:   Procedure Laterality Date    COLONOSCOPY  2012    DILATION AND CURETTAGE OF UTERUS  2021    [address], polypectomy    DILATION AND CURETTAGE OF UTERUS  2022    [address] - old blood in [facility], no gross lesions    MO ARTHROPLASTY I-P JT Right 10/10/2018    RIGHT INDEX FINGER PIP JOINT ARTHROPLASTY performed by Jasen Triplett MD at [place] [address]         Medications: Current Outpatient Medications:     estradiol (ESTRACE VAGINAL) 0.1 MG/GM vaginal cream, Place 0.5 g vaginally See Admin Instructions Twice weekly application. , Disp: 42.5 g, Rfl: 3    Multiple Vitamins-Minerals (CENTRUM/CERTA-GABBY WITH MINERALS ORAL) solution, Take 15 mLs by mouth daily, Disp: , Rfl:     hydroCHLOROthiazide (HYDRODIURIL) 25 MG tablet, Take 25 mg by mouth daily, Disp: , Rfl:     gabapentin (NEURONTIN) 100 MG capsule, TK 1 C PO QD, Disp: , Rfl:     escitalopram (LEXAPRO) 5 MG tablet, TK 1 T PO QD, Disp: , Rfl:     hydroxychloroquine (PLAQUENIL) 200 MG tablet, Take by mouth daily, Disp: , Rfl:     triamterene-hydrochlorothiazide (MAXZIDE-25) 37.5-25 MG per tablet, Take 1 tablet by mouth daily, Disp: , Rfl:     Calcium Citrate-Vitamin D (CALCIUM + D PO), Take 1 tablet by mouth daily, Disp: , Rfl:     levocetirizine (XYZAL) 5 MG tablet, Take 5 mg by mouth as needed, Disp: , Rfl:     albuterol sulfate  (90 BASE) MCG/ACT inhaler, Inhale 2 puffs into the lungs every 6 hours as needed for Wheezing, Disp: , Rfl:     levothyroxine (SYNTHROID) 75 MCG tablet, Take 75 mcg by mouth daily.   , Disp: , Rfl:   Allergies: Iodine, Prednisone, Scopolamine, and Demerol      SUBJECTIVE EXAMINATION      ,           Subjective History:    Subjective: pt presents to therapy with c/o B knee pain for several weeks of insidious onset; no PMH for B knees/LB injury/sx per pt; no testing for B knees on file at this time; c/o occassional  N/T as well as buckling B LE's; MEDS help somewhat; sleep is hampered due to aching; follow-up scheduled in February of next year  Additional Pertinent Hx (if applicable):            Learning/Language:       Pain Screening    Pain Screening  Patient Currently in Pain: Yes  Pain Assessment: 0-10  Pain Level: 7  Pain Type: Chronic pain  Pain Location: Knee  Pain Orientation: Right, Left  Pain Descriptors: Aching, Burning  Functional Pain Assessment: Prevents or interferes with many active not passive activities    OBJECTIVE EXAMINATION     Regional Screen:   Lumbar Screen: AROM WNL for all ranges with no c/o radiculopathy noted  Hip Screen: AROM/strength WNL for all ranges  Ankle Screen: AROM/strength WNL for all ranges     Observations:   General Observations  Description: level ASIS/PSIS/iliacs; ant pelvic tilt noted     pt presents with slight genu valgus in standing B knees    Palpation:   Right Knee Palpation: aching percieved across B jt lines into B femoral condyles  Left Knee Palpation: aching percieved across B jt lines into B femoral condyles    Ambulation/Gait (if applicable):   Ambulation  Surface: Level tile  Device: No Device  Assistance: Independent  Gait Deviations: None    Balance Screen:   Balance  Comments: static/dynamic balance is GOOD/GOOD+    Neuro Screen:   Sensation      Sensation  Overall Sensation Status: WNL     Left AROM  Right AROM      General AROM LE: Right WNL, Left WNL    General AROM LE: Right WNL, Left WNL        Left PROM  Right PROM                    Left Strength  Right Strength         Strength LLE  L Hip Flexion: 5/5  L Hip Extension: 5/5  L Hip ABduction: 5/5  L Hip ADduction: 5/5  L Knee Flexion: 4/5  L Knee Extension: 4/5    Strength RLE  R Hip Flexion: 5/5  R Hip Extension: 5/5  R Hip ABduction: 5/5  R Hip ADduction: 5/5  R Knee Flexion: 4/5  R Knee Extension: 4/5     Endurance:  GOOD for all prolonged activities      ASSESSMENT     Impression: Assessment: pain noted across B knees with all prolonged activities, 7/10    Body Structures, Functions, Activity Limitations Requiring Skilled Therapeutic Intervention: Decreased strength, Decreased endurance    Statement of Medical Necessity: Physical Therapy is both indicated and medically necessary as outlined in the POC to increase the likelihood of meeting the functionally related goals stated below.      Patient's Activity Tolerance: Patient tolerated evaluation without incident      Patient's rehabilitation potential/prognosis is considered to be: Fair    Factors which may impact rehabilitation potential include:          GOALS   Patient Goal(s):    Goals Completed by 3 weeks Goal Status   Decrease pain across B knees with all prolonged activities, 0-4/10     Increase strength across B knees to grossly 4+, 5/5 for all ranges improving static/dynamic balance to GOOD+/NORMAL     Improve endurance for all prolonged activities to GOOD/GOOD+     Assure I with HEP for home management of condition                  TREATMENT PLAN     Pt. actively involved in establishing Plan of Care and Goals: Yes      Treatment may include any combination of the following: ROM, Strengthening, Endurance training, Modalities, Home exercise program     Frequency / Duration:  Patient to be seen pt to be seen 2x/week/3 weeks for   weeks      Eval Complexity:    Decision Making: Low Complexity        Therapist Signature: Jacob Richardson, PT    Date: 21/63/2709     I certify that the above Therapy Services are being furnished while the patient is under my care. I agree with the treatment plan and certify that this therapy is necessary. [de-identified] Signature:  ___________________________   Date:_______                                                                   BRIANA Delacruz - CNP        Physician Comments: _______________________________________________    Please sign and return to [facility]. Please fax to the location listed below.  Alonso Bullock for this referral!    [facility] [address]  Dept: [phone]       POC NOTE

## 2022-10-31 ENCOUNTER — HOSPITAL ENCOUNTER (OUTPATIENT)
Dept: PHYSICAL THERAPY | Age: 72
Setting detail: THERAPIES SERIES
Discharge: HOME | End: 2022-10-31
Payer: MEDICARE

## 2022-10-31 PROCEDURE — 97530 THERAPEUTIC ACTIVITIES: CPT | Performed by: PHYSICAL THERAPIST

## 2022-10-31 PROCEDURE — 97110 THERAPEUTIC EXERCISES: CPT | Performed by: PHYSICAL THERAPIST

## 2022-10-31 NOTE — PROGRESS NOTES
S:  pt presents to therapy for only scheduled visit for the week; at this time she continues to c/o pain/stiffness across B knees; pain level given as 7/10 and remains constant in nature; prolonged standing and walking up/down steps increase pain; no c/o buckling or LOB over last week's time; HEP going well per pt    O:  performed the exercises/treatments as written in the flowsheet for the week ending 11/4/22; initiated HEP for home manaagement of condition; AROM B knees WNL for all ranges; strength across B  knees grossly 4/5 for all planes    A:  jaspal tx well; pt able to perform all requested tasks with good form and pacing noted; AROM/strength across B knees remains stable since eval; gait stable with normal/equal mechanics noted B LE's; static/dynamic balance is GOOD/GOOD+; endurance for all prolonged activities is GOOD    P:  cont with POC of strengthening/endurance activities for B LE's with modalities as needed

## 2022-10-31 NOTE — PROGRESS NOTES
[address] [facility]                Phone: [phone]   Fax: [phone]    Physical Therapy Daily Treatment Note  Date:  10/31/2022    Patient Name:  Tonie Rubi    :  1950  MRN: [id number]    Evaluating therapist:  BEATRIZ Diaz                (10/25/22)  Restrictions/Precautions:    Diagnosis:  OA B knees   Treatment Diagnosis:    Insurance/Certification information:  [facility]               cert dates:    to  23               ICD-10:  M17.9  Referring Practitioner:  Ilda Rodriguez Plan of care signed (Y/N):  Y  Visit# / total visits:    Pain level: 7/10   Time In:  951  Time Out:  1059    Subjective:      Exercises:  Exercise/Equipment Resistance/Repetitions Other comments   StepOne   10 min            tball flex/rot 10x10s           sit/stand 3x10s           NK flex/ext  2x10x3.75lb           toe raises 2x15    step ups 2x10s6\"            side 2x10x6\"                                                              Other Therapeutic Activities:      Home Exercise Program:  provided 10/25/22; 10/31/22    Manual Treatments:      Modalities:  MH to B knees x 15 min     Timed Code Treatment Minutes: Total Treatment Minutes:      Treatment/Activity Tolerance:  [] Patient tolerated treatment well [] Patient limited by fatique  [] Patient limited by pain  [] Patient limited by other medical complications  [] Other:     Prognosis: [] Good [] Fair  [] Poor    Patient Requires Follow-up: [] Yes  [] No    Plan:   [] Continue per plan of care [] Alter current plan (see comments)  [] Plan of care initiated [] Hold pending MD visit [] Discharge  Plan for Next Session:      See Weekly Progress Note: []  Yes  []  No  Next due:        Electronically signed by:   Elizabeth Garcia PT

## 2022-11-07 ENCOUNTER — HOSPITAL ENCOUNTER (OUTPATIENT)
Dept: PHYSICAL THERAPY | Age: 72
Setting detail: THERAPIES SERIES
Discharge: HOME | End: 2022-11-07
Payer: MEDICARE

## 2022-11-07 PROCEDURE — 97110 THERAPEUTIC EXERCISES: CPT | Performed by: PHYSICAL THERAPIST

## 2022-11-07 PROCEDURE — 97530 THERAPEUTIC ACTIVITIES: CPT | Performed by: PHYSICAL THERAPIST

## 2022-11-07 NOTE — PROGRESS NOTES
[address] [facility]                Phone: [phone]   Fax: [phone]    Physical Therapy Daily Treatment Note  Date:  2022    Patient Name:  Kira Zuniga    :  1950  MRN: [id number]    Evaluating therapist:  BEATRIZ Thompson                (10/25/22)  Restrictions/Precautions:    Diagnosis:  OA B knees   Treatment Diagnosis:    Insurance/Certification information:  [facility] Medicare               cert dates:    to  23               ICD-10:  M17.9  Referring Practitioner:  Geoffrey Bernal Plan of care signed (Y/N):  Y  Visit# / total visits:  3/6  Pain level: 7/10   Time In:  951  Time Out:  1050    Subjective:      Exercises:  Exercise/Equipment Resistance/Repetitions Other comments   StepOne   10 min            tball flex/rot 10x10s           sit/stand 3x10s           NK flex/ext  2x10x3.75lb           toe raises 2x15    step ups 2x10s6\"            side 2x10x6\"                                                              Other Therapeutic Activities:      Home Exercise Program:  provided 10/25/22; 10/31/22    Manual Treatments:      Modalities:  MH to B knees x 15 min     Timed Code Treatment Minutes: Total Treatment Minutes:      Treatment/Activity Tolerance:  [] Patient tolerated treatment well [] Patient limited by fatique  [] Patient limited by pain  [] Patient limited by other medical complications  [] Other:     Prognosis: [] Good [] Fair  [] Poor    Patient Requires Follow-up: [] Yes  [] No    Plan:   [] Continue per plan of care [] Alter current plan (see comments)  [] Plan of care initiated [] Hold pending MD visit [] Discharge  Plan for Next Session:      See Weekly Progress Note: []  Yes  []  No  Next due:        Electronically signed by:   Brittny Lopez PT

## 2022-11-07 NOTE — PROGRESS NOTES
S:  pt presents to therapy for only scheduled visit for the week; at this time she continues to c/o pain/stiffness across B knees; pain level given as 7/10 and remains constant in nature; prolonged standing and walking up/down steps increase pain; no c/o buckling or LOB over last week's time; HEP going well per pt    O:  performed the exercises/treatments as written in the flowsheet for the week ending 11/11/22;  AROM B knees WNL for all ranges; strength across B  knees grossly 4/5 for all planes    A:  jaspal tx well; pt able to perform all requested tasks with good form and pacing noted; AROM/strength across B knees remains stable since eval; gait stable with normal/equal mechanics noted B LE's; static/dynamic balance is GOOD/GOOD+; endurance for all prolonged activities is GOOD    P:  cont with POC of strengthening/endurance activities for B LE's with modalities as needed

## 2022-11-14 ENCOUNTER — HOSPITAL ENCOUNTER (OUTPATIENT)
Dept: PHYSICAL THERAPY | Age: 72
Setting detail: THERAPIES SERIES
Discharge: HOME | End: 2022-11-14
Payer: MEDICARE

## 2022-11-14 PROCEDURE — 97530 THERAPEUTIC ACTIVITIES: CPT | Performed by: PHYSICAL THERAPIST

## 2022-11-14 PROCEDURE — 97110 THERAPEUTIC EXERCISES: CPT | Performed by: PHYSICAL THERAPIST

## 2022-11-14 NOTE — PROGRESS NOTES
[address] [facility]                Phone: [phone]   Fax: [phone]    Physical Therapy Daily Treatment Note  Date:  2022    Patient Name:  Ysabel De Oliveira    :  1950  MRN: [id number]    Evaluating therapist:  BEATRIZ Guzman                (10/25/22)  Restrictions/Precautions:    Diagnosis:  OA B knees   Treatment Diagnosis:    Insurance/Certification information:  [facility] Medicare               cert dates:    to  23               ICD-10:  M17.9  Referring Practitioner:  Cielo Rojas Plan of care signed (Y/N):  Y  Visit# / total visits:    Pain level: 7/10   Time In:  952  Time Out:  1044    Subjective:      Exercises:  Exercise/Equipment Resistance/Repetitions Other comments   StepOne   10 min            tball flex/rot 10x10s           sit/stand 3x10s           NK flex/ext  2x10x3.75lb           toe raises 2x15    step ups 2x10s6\"            side 2x10x6\"                                                              Other Therapeutic Activities:      Home Exercise Program:  provided 10/25/22; 10/31/22    Manual Treatments:      Modalities:  MH to B knees x 15 min     Timed Code Treatment Minutes: Total Treatment Minutes:      Treatment/Activity Tolerance:  [] Patient tolerated treatment well [] Patient limited by fatique  [] Patient limited by pain  [] Patient limited by other medical complications  [] Other:     Prognosis: [] Good [] Fair  [] Poor    Patient Requires Follow-up: [] Yes  [] No    Plan:   [] Continue per plan of care [] Alter current plan (see comments)  [] Plan of care initiated [] Hold pending MD visit [] Discharge  Plan for Next Session:      See Weekly Progress Note: []  Yes  []  No  Next due:        Electronically signed by:   Jimmy Samuels PT

## 2022-11-14 NOTE — PROGRESS NOTES
S:  pt presents to therapy for only scheduled visit for the week; at this time she continues to c/o pain/stiffness across B knees; no real change noted in pain level given as 7/10 and remains constant in nature; prolonged standing and walking up/down steps increase pain; no c/o buckling or LOB over last week's time; HEP going well per pt    O:  performed the exercises/treatments as written in the flowsheet for the week ending 11/18/22;  AROM B knees WNL for all ranges; strength across B  knees grossly 4/5 for all planes    A:  jaspal tx well; pt able to perform all requested tasks with good form and pacing noted; AROM/strength across B knees remains stable since eval; gait stable with normal/equal mechanics noted B LE's; static/dynamic balance is GOOD/GOOD+; endurance for all prolonged activities is GOOD    P:  cont with POC of strengthening/endurance activities for B LE's with modalities as needed

## 2022-11-16 ENCOUNTER — APPOINTMENT (OUTPATIENT)
Dept: PHYSICAL THERAPY | Age: 72
End: 2022-11-16
Payer: MEDICARE

## 2022-11-21 ENCOUNTER — HOSPITAL ENCOUNTER (OUTPATIENT)
Dept: PHYSICAL THERAPY | Age: 72
Setting detail: THERAPIES SERIES
Discharge: HOME | End: 2022-11-21
Payer: MEDICARE

## 2022-11-21 PROCEDURE — 97110 THERAPEUTIC EXERCISES: CPT | Performed by: PHYSICAL THERAPIST

## 2022-11-21 PROCEDURE — 97530 THERAPEUTIC ACTIVITIES: CPT | Performed by: PHYSICAL THERAPIST

## 2022-11-21 NOTE — PROGRESS NOTES
[address] [facility]                Phone: [phone]   Fax: [phone]    Physical Therapy Daily Treatment Note  Date:  2022    Patient Name:  Steve Ibanez    :  1950  MRN: [id number]    Evaluating therapist:  BEATRIZ Graves                (10/25/22)  Restrictions/Precautions:    Diagnosis:  OA B knees   Treatment Diagnosis:    Insurance/Certification information:  [facility] Medicare               cert dates:    to  23               ICD-10:  M17.9  Referring Practitioner:  Shiv Hendricks Plan of care signed (Y/N):  Y  Visit# / total visits:    Pain level: 7/10   Time In:  953  Time Out:  1041    Subjective:      Exercises:  Exercise/Equipment Resistance/Repetitions Other comments   StepOne   10 min            tball flex/rot 10x10s           sit/stand 3x10s           NK flex/ext  2x10x3.75lb           toe raises 2x15    step ups 2x10s6\"            side 2x10x6\"                                                              Other Therapeutic Activities:      Home Exercise Program:  provided 10/25/22; 10/31/22    Manual Treatments:      Modalities:  MH to B knees x 15 min     Timed Code Treatment Minutes: Total Treatment Minutes:      Treatment/Activity Tolerance:  [] Patient tolerated treatment well [] Patient limited by fatique  [] Patient limited by pain  [] Patient limited by other medical complications  [] Other:     Prognosis: [] Good [] Fair  [] Poor    Patient Requires Follow-up: [] Yes  [] No    Plan:   [] Continue per plan of care [] Alter current plan (see comments)  [] Plan of care initiated [] Hold pending MD visit [] Discharge  Plan for Next Session:      See Weekly Progress Note: []  Yes  []  No  Next due:        Electronically signed by:   Jacob Richardson PT

## 2022-12-08 NOTE — PROGRESS NOTES
[address] [facility]                Phone: [phone]   Fax: [phone]    To: Magno Roman       From: Kavin Wilks PT,BEATRIZ      Patient: Jaclyn Pacheco       : 1950  Diagnosis:       Date: 2022  Treatment Diagnosis:  OA B knees    Physical Therapy Discharge Note    Total Visits to date:  5  Cancels/No-shows to date:      Plan of Care/Treatment to date:  [x] Therapeutic Exercise    [] Modalities:  [x] Therapeutic Activity     [] Ultrasound  [] Electrical Stimulation  [x] Gait Training      [] Cervical Traction   [] Lumbar Traction  [] Neuromuscular Re-education  [] Cold/hotpack [] Iontophoresis  [x] Instruction in HEP      Other:  [] Manual Therapy       []    [] Aquatic Therapy       []                            Progress towards goals:   pt reached all stated functional LTG's for therapy and was I with [facility] for home management of condition       Goal Status:  [x] Achieved [] Partially Achieved  [] Not Achieved     Patient Status:              [] Continue per initial plan of Care     [x] Patient now discharged to [facility] for home management of condition      [] Additional visits requested, Please re-certify for additional visits:          Electronically signed by: Kavin Wilks PT ,MPT     If you have any questions or concerns, please don't hesitate to call.   Thank you for your referral.

## 2023-01-11 ENCOUNTER — HOSPITAL ENCOUNTER (OUTPATIENT)
Dept: PHYSICAL THERAPY | Age: 73
Setting detail: THERAPIES SERIES
Discharge: HOME | End: 2023-01-11
Payer: MEDICARE

## 2023-01-11 PROCEDURE — 97161 PT EVAL LOW COMPLEX 20 MIN: CPT | Performed by: PHYSICAL THERAPIST

## 2023-01-11 ASSESSMENT — PAIN DESCRIPTION - PAIN TYPE: TYPE: CHRONIC PAIN

## 2023-01-11 ASSESSMENT — PAIN DESCRIPTION - ORIENTATION: ORIENTATION: RIGHT;LEFT

## 2023-01-11 ASSESSMENT — PAIN SCALES - GENERAL: PAINLEVEL_OUTOF10: 7

## 2023-01-11 ASSESSMENT — PAIN DESCRIPTION - DESCRIPTORS: DESCRIPTORS: ACHING;BURNING;NUMBNESS

## 2023-01-11 ASSESSMENT — PAIN DESCRIPTION - LOCATION: LOCATION: BACK;HIP

## 2023-01-11 ASSESSMENT — PAIN - FUNCTIONAL ASSESSMENT: PAIN_FUNCTIONAL_ASSESSMENT: PREVENTS OR INTERFERES WITH MANY ACTIVE NOT PASSIVE ACTIVITIES

## 2023-01-11 NOTE — PROGRESS NOTES
Physical Therapy: Initial Evaluation    Patient: Lilly Jain (85 y.o. female)   Examination Date:   Plan of Care Certification Period: 2023 to        :  1950 ;    Confirmed: Yes MRN: [id number]  CSN: [id number]   Insurance: Payor: Anna Teague / Plan: Sarah Kindler / Product Type: *No Product type* /   Insurance ID: [id number] - (Medicare Managed) Secondary Insurance (if applicable):    Referring Physician: BRIANA Baca - CNP     PCP: Fredrick Patterson DO Visits to Date/Visits Approved:     No Show/Cancelled Appts:   /       Medical Diagnosis: Low back pain, unspecified [M54.50]  Sciatica, left side [M54.32] chronic LBP  Treatment Diagnosis:       PERTINENT MEDICAL HISTORY           Medical History: Chart Reviewed: Yes   Past Medical History:   Diagnosis Date    Acid reflux     Acquired hypothyroidism 11/15/2018    Arthritis     hands    Back problem     history of    Chronic left-sided low back pain with sciatica 11/15/2018    Chronic right hip pain 11/15/2018    Dizzy     states retains fluid in my head, takes maxzide to treat    Finger pain, right 10/2018    index    Foot cramps 11/15/2018    Worse at night    Hyperlipidemia     Hypothyroidism     Idiopathic peripheral neuropathy 11/15/2018    Abnl.  NCS study, dx with sensory peripheral neuropathy, hx hypothyroidism    Lupus ([address]     Neuropathy of left lower extremity     PONV (postoperative nausea and vomiting)     Stomach pain     Thyroid disease     Wears glasses      Surgical History:   Past Surgical History:   Procedure Laterality Date    COLONOSCOPY  2012    DILATION AND CURETTAGE OF UTERUS  2021    [address] [facility] polypectomy    DILATION AND CURETTAGE OF UTERUS  2022    [address] [facility] - old blood in [facility], no gross lesions    IA ARTHROPLASTY INTERPHALANGEAL JOINT EACH Right 10/10/2018    RIGHT INDEX FINGER PIP JOINT ARTHROPLASTY performed by Adenike Prakash MD at [address] [place] [address],Third Floor GASTROINTESTINAL ENDOSCOPY      UTERINE FIBROID SURGERY         Medications:   Current Outpatient Medications:     estradiol (ESTRACE VAGINAL) 0.1 MG/GM vaginal cream, Place 0.5 g vaginally See Admin Instructions Twice weekly application. , Disp: 42.5 g, Rfl: 3    Multiple Vitamins-Minerals (CENTRUM/CERTA-GABBY WITH MINERALS ORAL) solution, Take 15 mLs by mouth daily, Disp: , Rfl:     hydroCHLOROthiazide (HYDRODIURIL) 25 MG tablet, Take 25 mg by mouth daily, Disp: , Rfl:     gabapentin (NEURONTIN) 100 MG capsule, TK 1 C PO QD, Disp: , Rfl:     escitalopram (LEXAPRO) 5 MG tablet, TK 1 T PO QD, Disp: , Rfl:     hydroxychloroquine (PLAQUENIL) 200 MG tablet, Take by mouth daily, Disp: , Rfl:     triamterene-hydrochlorothiazide (MAXZIDE-25) 37.5-25 MG per tablet, Take 1 tablet by mouth daily, Disp: , Rfl:     Calcium Citrate-Vitamin D (CALCIUM + D PO), Take 1 tablet by mouth daily, Disp: , Rfl:     levocetirizine (XYZAL) 5 MG tablet, Take 5 mg by mouth as needed, Disp: , Rfl:     albuterol sulfate  (90 BASE) MCG/ACT inhaler, Inhale 2 puffs into the lungs every 6 hours as needed for Wheezing, Disp: , Rfl:     levothyroxine (SYNTHROID) 75 MCG tablet, Take 75 mcg by mouth daily. , Disp: , Rfl:   Allergies: Iodine, Prednisone, Scopolamine, and Demerol      SUBJECTIVE EXAMINATION      ,           Subjective History:  Onset Date: 01/03/23  Subjective: pt presents to therapy with c/o LBP for the last two weeks of insidious onset; no PMH for LB/LE injury/sx per pt; no testng on file for LB at this time; MEDS help a little; c/o N/T into L LE to calf at times but no issue with buckling; sleep is fine; no neuro or pain management consults noted; RTD for follow-up in 3 months  Additional Pertinent Hx (if applicable):            Learning/Language: Learning  Does the patient/guardian have any barriers to learning?: No barriers  What is the preferred language of the patient/guardian?: English     Pain Screening    Pain Screening  Patient Currently in Pain: Yes  Pain Assessment: 0-10  Pain Level: 7  Pain Type: Chronic pain  Pain Location: Back, Hip  Pain Orientation: Right, Left  Pain Descriptors: Aching, Burning, Numbness  Functional Pain Assessment: Prevents or interferes with many active not passive activities  Aggravating factors: Sitting       OBJECTIVE EXAMINATION   Endurance:  FAIR+/GOOD- for all prolonged activities/postures     VBI Screening / Lumbar Screening:    Bowel/bladder disturbances: No  Saddle anesthesia: No  Unexplained weight loss: No  Severe motor weakness: No  Stumbling or giving way while walking: No  Unrelenting pain at night: No    Regional Screen:   Hip Screen: AROM/strength WNL for all ranges  Knee Screen: AROM/strength WNL for all ranges  Ankle Screen: AROM/strength WNL for all ranges     Observations:   General Observations  Description: level ASIS/PSIS/iliacs; ant pelvic tilt noted    Palpation:   Lumbar Spine Palpation: discomfort noted across L side LB paraspinals L1-5 into L SI line and buttock    Ambulation/Gait (if applicable):   Ambulation  Surface: Level tile  Device: No Device  Assistance: Independent  Gait Deviations: None    Balance Screen:   Balance  Comments: static/dynamic balance is GOOD/GOOD+    Neuro Screen:   Sensation      Sensation  Overall Sensation Status: WNL       Lumbar Assessment     AROM Lumbar Spine   Lumbar spine general AROM: grossly limited ~ 25% WNL for all ranges with no c/o radiculopathy noted     Mobility (if applicable):        Special Tests:   Special Tests Lumbar Spine  SLR: R (-), L (-)  Slump Test: R (-), L (+)  Prone Hyperflexion/Rotation Tests[de-identified] (+/+)       ASSESSMENT     Impression: Assessment: pain noted across L side LB/LE with all prolonged activities/postures, 7/10    Body Structures, Functions, Activity Limitations Requiring Skilled Therapeutic Intervention: Decreased ROM, Decreased endurance    Statement of Medical Necessity: Physical Therapy is both indicated and medically necessary as outlined in the POC to increase the likelihood of meeting the functionally related goals stated below. Patient's Activity Tolerance: Patient tolerated evaluation without incident      Patient's rehabilitation potential/prognosis is considered to be: Fair, Good    Factors which may impact rehabilitation potential include:          GOALS   Patient Goal(s):    Goals Completed by 3-4 weeks Goal Status   Decrease pain across L side LB/LE with all prolonged activities to 0-3/10     Restore LB AROM to WNL for all ranges     Improve endurance for all prolonged activities/postures to GOOD/GOOD+     Assure I with HEP for home management of condition                  TREATMENT PLAN     Pt. actively involved in establishing Plan of Care and Goals: Yes    Treatment may include any combination of the following: ROM, Strengthening, Endurance training, Modalities, Home exercise program     Frequency / Duration:  Patient to be seen pt to be seen 2x/week/3-4 weeks for   weeks      Eval Complexity:    Decision Making: Low Complexity        Therapist Signature: Km Abarca, PT    Date: 5/71/6139     I certify that the above Therapy Services are being furnished while the patient is under my care. I agree with the treatment plan and certify that this therapy is necessary. [de-identified] Signature:  ___________________________   Date:_______                                                                   BRIANA Hitchcock - CNP        Physician Comments: _______________________________________________    Please sign and return to [facility]. Please fax to the location listed below.  Alonso Bullock for this referral!    [facility] [phone]  Dept: [phone]       POC NOTE

## 2023-01-11 NOTE — PROGRESS NOTES
[address] [facility]                Phone: [phone]   Fax: [phone]    Physical Therapy Daily Treatment Note  Date:  2023    Patient Name:  Иван Morin    :  1950  MRN: [id number]    Evaluating therapist:  BEATRIZ Nance                     (23)  Restrictions/Precautions:    Diagnosis:  chronic LBP  Treatment Diagnosis:    Insurance/Certification information:  [facility] Medicare   Referring Pratitioner:  LC Roman  Plan of care signed (Y/N):    Visit# / total visits:  -  Pain level: 7/10   Time In:  Time Out:    Subjective:      Exercises:  Exercise/Equipment Resistance/Repetitions Other comments   StepOne              tball flex/rot             rot st     SKTC     piriformis st     hamst st     gastroc st              pelvic tilts               bridges                                                         Other Therapeutic Activities:      Home Exercise Program:  provided 23    Manual Treatments:      Modalities:  IFC/MH to LB     Timed Code Treatment Minutes: Total Treatment Minutes:      Treatment/Activity Tolerance:  [] Patient tolerated treatment well [] Patient limited by fatique  [] Patient limited by pain  [] Patient limited by other medical complications  [] Other:     Prognosis: [] Good [] Fair  [] Poor    Patient Requires Follow-up: [] Yes  [] No    Plan:   [] Continue per plan of care [] Alter current plan (see comments)  [] Plan of care initiated [] Hold pending MD visit [] Discharge  Plan for Next Session:      See Weekly Progress Note: []  Yes  []  No  Next due:        Electronically signed by:   Micheal Matos PT

## 2023-01-16 ENCOUNTER — HOSPITAL ENCOUNTER (OUTPATIENT)
Dept: PHYSICAL THERAPY | Age: 73
Setting detail: THERAPIES SERIES
Discharge: HOME | End: 2023-01-16
Payer: MEDICARE

## 2023-01-16 PROCEDURE — 97110 THERAPEUTIC EXERCISES: CPT | Performed by: PHYSICAL THERAPIST

## 2023-01-16 PROCEDURE — G0283 ELEC STIM OTHER THAN WOUND: HCPCS | Performed by: PHYSICAL THERAPIST

## 2023-01-16 NOTE — PROGRESS NOTES
[address] [facility]                Phone: [phone]   Fax: [phone]    Physical Therapy Daily Treatment Note  Date:  2023    Patient Name:  Liana Loomis    :  1950  MRN: [id number]    Evaluating therapist:  BEATRIZ Mittal                     (23)  Restrictions/Precautions:    Diagnosis:  chronic LBP  Treatment Diagnosis:    Insurance/Certification information:  [facility] Medicare   Referring Pratitioner:  LC Roman  Plan of care signed (Y/N):    Visit# / total visits:  -  Pain level: 7/10   Time In:  900  Time Out:  1005    Subjective:      Exercises:  Exercise/Equipment Resistance/Repetitions Other comments   StepOne   10 min            tball flex/rot  10x10s           rot st 3x20s    SKTC 3x20s    piriformis st 3x20s    hamst st 3x20s    gastroc st 3x20s             pelvic tilts 15x3s              bridges  15x3s                                                       Other Therapeutic Activities:      Home Exercise Program:  provided 23    Manual Treatments:      Modalities:  IFC/MH to LB x 15 min     Timed Code Treatment Minutes: Total Treatment Minutes:      Treatment/Activity Tolerance:  [] Patient tolerated treatment well [] Patient limited by fatique  [] Patient limited by pain  [] Patient limited by other medical complications  [] Other:     Prognosis: [] Good [] Fair  [] Poor    Patient Requires Follow-up: [] Yes  [] No    Plan:   [] Continue per plan of care [] Alter current plan (see comments)  [] Plan of care initiated [] Hold pending MD visit [] Discharge  Plan for Next Session:      See Weekly Progress Note: []  Yes  []  No  Next due:        Electronically signed by:   Bayron Kuo PT

## 2023-01-18 ENCOUNTER — HOSPITAL ENCOUNTER (OUTPATIENT)
Dept: PHYSICAL THERAPY | Age: 73
Setting detail: THERAPIES SERIES
Discharge: HOME | End: 2023-01-18
Payer: MEDICARE

## 2023-01-18 PROCEDURE — 97110 THERAPEUTIC EXERCISES: CPT | Performed by: PHYSICAL THERAPIST

## 2023-01-18 PROCEDURE — G0283 ELEC STIM OTHER THAN WOUND: HCPCS | Performed by: PHYSICAL THERAPIST

## 2023-01-18 NOTE — PROGRESS NOTES
[address] [facility]                Phone: [phone]   Fax: [phone]    Physical Therapy Daily Treatment Note  Date:  2023    Patient Name:  Liana Loomis    :  1950  MRN: [id number]    Evaluating therapist:  BEATRIZ Mittal                     (23)  Restrictions/Precautions:    Diagnosis:  chronic LBP  Treatment Diagnosis:    Insurance/Certification information:  [facility] Medicare   Referring Pratitioner:  LC Roman  Plan of care signed (Y/N):    Visit# / total visits:  3/6-  Pain level: 7/10   Time In:  859  Time Out:  952    Subjective:      Exercises:  Exercise/Equipment Resistance/Repetitions Other comments   StepOne   10 min            tball flex/rot  10x10s           rot st 3x20s    SKTC 3x20s    piriformis st 3x20s    hamst st 3x20s    gastroc st 3x20s             pelvic tilts 15x3s              bridges  15x3s                                                       Other Therapeutic Activities:      Home Exercise Program:  provided 23    Manual Treatments:      Modalities:  IFC/MH to LB x 15 min     Timed Code Treatment Minutes: Total Treatment Minutes:      Treatment/Activity Tolerance:  [] Patient tolerated treatment well [] Patient limited by fatique  [] Patient limited by pain  [] Patient limited by other medical complications  [] Other:     Prognosis: [] Good [] Fair  [] Poor    Patient Requires Follow-up: [] Yes  [] No    Plan:   [] Continue per plan of care [] Alter current plan (see comments)  [] Plan of care initiated [] Hold pending MD visit [] Discharge  Plan for Next Session:      See Weekly Progress Note: []  Yes  []  No  Next due:        Electronically signed by:   Bayron Kuo PT

## 2023-01-18 NOTE — PROGRESS NOTES
S:  pt presents to therapy for two of two scheduled visits this week;  at week's end she rerports that her back continues to ache with most prolonged activities/postures;  pain  level given as 7/10 and remains constant in nature; prolonged standing and walking up/down steps increase pain; no c/o buckling or LOB over last week's time; HEP going well per pt    O:  performed the exercises/treatments as written in the flowsheet for the week ending 1/20/23;  LB AROM grossly 75% WNL for all ranges;  B LE strength grossly /5 for all ranges     A:  jaspal tx well; pt able to perform all requested tasks with good form and pacing noted; LB AROM/B LE strength grossly stable since eval; gait stable with normal/equal mechanics noted B LE's;  endurance for all prolonged activities is GOOD    P:  cont with POC of strengthening/endurance activities for LB/B LE's with modalities as needed

## 2023-01-23 ENCOUNTER — HOSPITAL ENCOUNTER (OUTPATIENT)
Dept: PHYSICAL THERAPY | Age: 73
Setting detail: THERAPIES SERIES
Discharge: HOME | End: 2023-01-23
Payer: MEDICARE

## 2023-01-23 PROCEDURE — 97110 THERAPEUTIC EXERCISES: CPT | Performed by: PHYSICAL THERAPIST

## 2023-01-23 PROCEDURE — G0283 ELEC STIM OTHER THAN WOUND: HCPCS | Performed by: PHYSICAL THERAPIST

## 2023-01-23 NOTE — PROGRESS NOTES
[address] [facility]                Phone: [phone]   Fax: [phone]    Physical Therapy Daily Treatment Note  Date:  2023    Patient Name:  Raymundo Tay    :  1950  MRN: [id number]    Evaluating therapist:  Cristobal Brittle, MPT                     (23)  Restrictions/Precautions:    Diagnosis:  chronic LBP  Treatment Diagnosis:    Insurance/Certification information:  [facility] Medicare   Referring Pratitioner:  LC Roman  Plan of care signed (Y/N):    Visit# / total visits:  -  Pain level: 7/10   Time In:  857  Time Out:  954    Subjective:      Exercises:  Exercise/Equipment Resistance/Repetitions Other comments   StepOne   10 min            tball flex/rot  10x10s           rot st 3x20s    SKTC 3x20s    piriformis st 3x20s    hamst st 3x20s    gastroc st 3x20s             pelvic tilts 15x3s              bridges  15x3s                                                       Other Therapeutic Activities:      Home Exercise Program:  provided 23    Manual Treatments:      Modalities:  IFC/MH to LB x 15 min     Timed Code Treatment Minutes: Total Treatment Minutes:      Treatment/Activity Tolerance:  [] Patient tolerated treatment well [] Patient limited by fatique  [] Patient limited by pain  [] Patient limited by other medical complications  [] Other:     Prognosis: [] Good [] Fair  [] Poor    Patient Requires Follow-up: [] Yes  [] No    Plan:   [] Continue per plan of care [] Alter current plan (see comments)  [] Plan of care initiated [] Hold pending MD visit [] Discharge  Plan for Next Session:      See Weekly Progress Note: []  Yes  []  No  Next due:        Electronically signed by:   Opal Carrillo PT

## 2023-01-25 ENCOUNTER — HOSPITAL ENCOUNTER (OUTPATIENT)
Dept: PHYSICAL THERAPY | Age: 73
Setting detail: THERAPIES SERIES
Discharge: HOME | End: 2023-01-25
Payer: MEDICARE

## 2023-01-25 PROCEDURE — G0283 ELEC STIM OTHER THAN WOUND: HCPCS | Performed by: PHYSICAL THERAPIST

## 2023-01-25 PROCEDURE — 97110 THERAPEUTIC EXERCISES: CPT | Performed by: PHYSICAL THERAPIST

## 2023-01-25 NOTE — PROGRESS NOTES
S:  pt presents to therapy for two of two scheduled visits this week;  at week's end she rerports that her back continues to ache with most prolonged activities/postures;  pain  level given as 5/10 and remains constant in nature; prolonged standing and walking up/down steps increase pain; no c/o buckling or LOB over last week's time; HEP going well per pt    O:  performed the exercises/treatments as written in the flowsheet for the week ending 1/27/23;  LB AROM grossly 85% WNL for all ranges;  B LE strength grossly /5 for all ranges     A:  jaspal tx well; pt able to perform all requested tasks with good form and pacing noted; LB AROM shows some overall imrpovement across all ranges while B LE strength grossly stable since eval; gait stable with normal/equal mechanics noted B LE's;  endurance for all prolonged activities is GOOD    P:  cont with POC of strengthening/endurance activities for LB/B LE's with modalities as needed

## 2023-01-25 NOTE — PROGRESS NOTES
[address] [facility]                Phone: [phone]   Fax: [phone]    Physical Therapy Daily Treatment Note  Date:  2023    Patient Name:  Christina Dumont    :  1950  MRN: [id number]    Evaluating therapist:  BEATRIZ Denis                     (23)  Restrictions/Precautions:    Diagnosis:  chronic LBP  Treatment Diagnosis:    Insurance/Certification information:  [employer] Medicare   Referring Pratitioner:  LC Roman  Plan of care signed (Y/N):    Visit# / total visits:  -  Pain level: 7/10   Time In:  857  Time Out:  944    Subjective:      Exercises:  Exercise/Equipment Resistance/Repetitions Other comments   StepOne   10 min            tball flex/rot  10x10s           rot st 3x20s    SKTC 3x20s    piriformis st 3x20s    hamst st 3x20s    gastroc st 3x20s             pelvic tilts 15x3s              bridges  15x3s                                                       Other Therapeutic Activities:      Home Exercise Program:  provided 23    Manual Treatments:      Modalities:  IFC/MH to LB x 15 min     Timed Code Treatment Minutes: Total Treatment Minutes:      Treatment/Activity Tolerance:  [] Patient tolerated treatment well [] Patient limited by fatique  [] Patient limited by pain  [] Patient limited by other medical complications  [] Other:     Prognosis: [] Good [] Fair  [] Poor    Patient Requires Follow-up: [] Yes  [] No    Plan:   [] Continue per plan of care [] Alter current plan (see comments)  [] Plan of care initiated [] Hold pending MD visit [] Discharge  Plan for Next Session:      See Weekly Progress Note: []  Yes  []  No  Next due:        Electronically signed by:   Lex Fuentes PT

## 2023-01-30 ENCOUNTER — HOSPITAL ENCOUNTER (OUTPATIENT)
Dept: PHYSICAL THERAPY | Age: 73
Setting detail: THERAPIES SERIES
Discharge: HOME | End: 2023-01-30
Payer: MEDICARE

## 2023-01-30 PROCEDURE — G0283 ELEC STIM OTHER THAN WOUND: HCPCS | Performed by: PHYSICAL THERAPIST

## 2023-01-30 PROCEDURE — 97110 THERAPEUTIC EXERCISES: CPT | Performed by: PHYSICAL THERAPIST

## 2023-02-01 ENCOUNTER — HOSPITAL ENCOUNTER (OUTPATIENT)
Dept: PHYSICAL THERAPY | Age: 73
Setting detail: THERAPIES SERIES
Discharge: HOME | End: 2023-02-01
Payer: MEDICARE

## 2023-02-01 PROCEDURE — 97110 THERAPEUTIC EXERCISES: CPT | Performed by: PHYSICAL THERAPIST

## 2023-02-01 PROCEDURE — G0283 ELEC STIM OTHER THAN WOUND: HCPCS | Performed by: PHYSICAL THERAPIST

## 2024-10-28 NOTE — PROGRESS NOTES
[address] [facility]                Phone: [phone]   Fax: [phone]    Physical Therapy Daily Treatment Note  Date:  2023    Patient Name:  Joselito Ugalde    :  1950  MRN: [id number]    Evaluating therapist:  BEATRIZ Sinha                     (23)  Restrictions/Precautions:    Diagnosis:  chronic LBP  Treatment Diagnosis:    Insurance/Certification information:  [employer] Medicare   Referring Pratitioner:  LC Roman  Plan of care signed (Y/N):    Visit# / total visits:    Pain level: 7/10   Time In:  858  Time Out:  947    Subjective:      Exercises:  Exercise/Equipment Resistance/Repetitions Other comments   StepOne   10 min            tball flex/rot  10x10s           rot st 3x20s    SKTC 3x20s    piriformis st 3x20s    hamst st 3x20s    gastroc st 3x20s             pelvic tilts 15x3s              bridges  15x3s                                                       Other Therapeutic Activities:      Home Exercise Program:  provided 23    Manual Treatments:      Modalities:  IFC/MH to LB x 15 min     Timed Code Treatment Minutes: Total Treatment Minutes:      Treatment/Activity Tolerance:  [] Patient tolerated treatment well [] Patient limited by fatique  [] Patient limited by pain  [] Patient limited by other medical complications  [] Other:     Prognosis: [] Good [] Fair  [] Poor    Patient Requires Follow-up: [] Yes  [] No    Plan:   [] Continue per plan of care [] Alter current plan (see comments)  [] Plan of care initiated [] Hold pending MD visit [] Discharge  Plan for Next Session:      See Weekly Progress Note: []  Yes  []  No  Next due:        Electronically signed by:   Francisca Waite PT Detail Level: Detailed Add 88402 Cpt? (Important Note: In 2017 The Use Of 37073 Is Being Tracked By Cms To Determine Future Global Period Reimbursement For Global Periods): no

## (undated) DEVICE — DRAPE CARM MINI FOR IMAG SYS INSIGHT FLROSCN

## (undated) DEVICE — BNDG,ELSTC,MATRIX,STRL,2"X5YD,LF,HOOK&LP: Brand: MEDLINE

## (undated) DEVICE — PADDING,UNDERCAST,COTTON, 3X4YD STERILE: Brand: MEDLINE

## (undated) DEVICE — SURGICAL PROCEDURE PACK HND

## (undated) DEVICE — CRADLE ARM W8.75XH12.5XL16IN FOAM SUPP ELEVATION VENT

## (undated) DEVICE — GAUZE,SPONGE,4"X4",16PLY,XRAY,STRL,LF: Brand: MEDLINE

## (undated) DEVICE — SUTURE FIBERLOOP 4-0 L10IN NONABSORBABLE BLU L17.9MM 3/8 AR722920

## (undated) DEVICE — SUTURE LABRALTAPE L36IN DIA1.5MM NONABSORBABLE WHT BLK AR7276T

## (undated) DEVICE — COVER HNDL LT DISP

## (undated) DEVICE — PRECISION THIN (9.0 X 0.38 X 25.0MM)

## (undated) DEVICE — SOLUTION IV IRRIG POUR BRL 0.9% SODIUM CHL 2F7124

## (undated) DEVICE — INTENDED FOR TISSUE SEPARATION, AND OTHER PROCEDURES THAT REQUIRE A SHARP SURGICAL BLADE TO PUNCTURE OR CUT.: Brand: BARD-PARKER ® STAINLESS STEEL BLADES

## (undated) DEVICE — ZIMMER® STERILE DISPOSABLE TOURNIQUET CUFF WITH PLC, DUAL PORT, SINGLE BLADDER, 18 IN. (46 CM)

## (undated) DEVICE — DOUBLE BASIN SET: Brand: MEDLINE INDUSTRIES, INC.

## (undated) DEVICE — K WIRE FIX L6IN DIA0.045IN 1600645] MICROAIRE SURGICAL INSTRUMENTS INC]
Type: IMPLANTABLE DEVICE | Site: HAND | Status: NON-FUNCTIONAL
Removed: 2018-10-10

## (undated) DEVICE — PADDING CAST W4INXL4YD SPUN DACRON POLY POR SYN VERSATILE

## (undated) DEVICE — SPLINT ORTH W4XL15IN PLSTR OF PARIS LO EXOTHERM SMOOTH

## (undated) DEVICE — SOLUTION IV IRRIG WATER 1000ML POUR BRL 2F7114

## (undated) DEVICE — SUTURE VIC +  4-0 27 IN PS1 UD VCP935H

## (undated) DEVICE — Device

## (undated) DEVICE — 2.0MM ROUND SOLID CARBIDE BUR MEDIUM

## (undated) DEVICE — GOWN,SIRUS,FABRNF,XL,20/CS: Brand: MEDLINE